# Patient Record
Sex: FEMALE | Race: BLACK OR AFRICAN AMERICAN | Employment: OTHER | URBAN - METROPOLITAN AREA
[De-identification: names, ages, dates, MRNs, and addresses within clinical notes are randomized per-mention and may not be internally consistent; named-entity substitution may affect disease eponyms.]

---

## 2017-02-08 ENCOUNTER — HOSPITAL ENCOUNTER (OUTPATIENT)
Dept: CT IMAGING | Age: 72
Discharge: HOME | End: 2017-02-08
Attending: PHYSICIAN ASSISTANT
Payer: MEDICARE

## 2017-02-08 DIAGNOSIS — R10.32 LEFT LOWER QUADRANT PAIN: ICD-10-CM

## 2017-02-08 LAB — CREAT UR-MCNC: 0.7 MG/DL (ref 0.6–1.3)

## 2017-02-08 PROCEDURE — 74177 CT ABD & PELVIS W/CONTRAST: CPT

## 2017-02-08 PROCEDURE — 82565 ASSAY OF CREATININE: CPT

## 2017-02-08 PROCEDURE — 74011636320 HC RX REV CODE- 636/320: Performed by: PHYSICIAN ASSISTANT

## 2017-02-08 RX ADMIN — IOPAMIDOL 100 ML: 612 INJECTION, SOLUTION INTRAVENOUS at 09:54

## 2017-03-21 ENCOUNTER — HOSPITAL ENCOUNTER (OUTPATIENT)
Dept: LAB | Age: 72
Discharge: HOME | End: 2017-03-21
Payer: MEDICARE

## 2017-03-21 DIAGNOSIS — R10.32 LEFT LOWER QUADRANT PAIN: ICD-10-CM

## 2017-03-21 LAB
BASOPHILS # BLD AUTO: 0 K/UL (ref 0–0.1)
BASOPHILS # BLD: 0 % (ref 0–2)
DIFFERENTIAL METHOD BLD: ABNORMAL
EOSINOPHIL # BLD: 0.1 K/UL (ref 0–0.4)
EOSINOPHIL NFR BLD: 1 % (ref 0–5)
ERYTHROCYTE [DISTWIDTH] IN BLOOD BY AUTOMATED COUNT: 12.7 % (ref 11.6–14.5)
HCT VFR BLD AUTO: 40.3 % (ref 35–45)
HGB BLD-MCNC: 13.4 G/DL (ref 12–16)
LYMPHOCYTES # BLD AUTO: 45 % (ref 21–52)
LYMPHOCYTES # BLD: 3.5 K/UL (ref 0.9–3.6)
MCH RBC QN AUTO: 33.3 PG (ref 24–34)
MCHC RBC AUTO-ENTMCNC: 33.3 G/DL (ref 31–37)
MCV RBC AUTO: 100 FL (ref 74–97)
MONOCYTES # BLD: 0.5 K/UL (ref 0.05–1.2)
MONOCYTES NFR BLD AUTO: 7 % (ref 3–10)
NEUTS SEG # BLD: 3.6 K/UL (ref 1.8–8)
NEUTS SEG NFR BLD AUTO: 47 % (ref 40–73)
PLATELET # BLD AUTO: 229 K/UL (ref 135–420)
PMV BLD AUTO: 10.5 FL (ref 9.2–11.8)
RBC # BLD AUTO: 4.03 M/UL (ref 4.2–5.3)
WBC # BLD AUTO: 7.7 K/UL (ref 4.6–13.2)

## 2017-03-21 PROCEDURE — 36415 COLL VENOUS BLD VENIPUNCTURE: CPT | Performed by: INTERNAL MEDICINE

## 2017-03-21 PROCEDURE — 85025 COMPLETE CBC W/AUTO DIFF WBC: CPT | Performed by: INTERNAL MEDICINE

## 2017-04-26 ENCOUNTER — OFFICE VISIT (OUTPATIENT)
Dept: ORTHOPEDIC SURGERY | Age: 72
End: 2017-04-26

## 2017-04-26 VITALS
TEMPERATURE: 98.2 F | RESPIRATION RATE: 16 BRPM | SYSTOLIC BLOOD PRESSURE: 133 MMHG | BODY MASS INDEX: 29.63 KG/M2 | DIASTOLIC BLOOD PRESSURE: 70 MMHG | HEIGHT: 62 IN | HEART RATE: 65 BPM | WEIGHT: 161 LBS

## 2017-04-26 DIAGNOSIS — G57.92 NEURITIS OF LEFT LOWER EXTREMITY: ICD-10-CM

## 2017-04-26 DIAGNOSIS — G89.29 OTHER CHRONIC PAIN: ICD-10-CM

## 2017-04-26 DIAGNOSIS — M47.812 CERVICAL FACET JOINT SYNDROME: ICD-10-CM

## 2017-04-26 DIAGNOSIS — Z51.81 THERAPEUTIC DRUG MONITORING: ICD-10-CM

## 2017-04-26 DIAGNOSIS — M48.061 LUMBAR SPINAL STENOSIS: ICD-10-CM

## 2017-04-26 DIAGNOSIS — G89.29 OTHER CHRONIC PAIN: Primary | ICD-10-CM

## 2017-04-26 DIAGNOSIS — M51.26 HNP (HERNIATED NUCLEUS PULPOSUS), LUMBAR: ICD-10-CM

## 2017-04-26 RX ORDER — HYDROCODONE BITARTRATE AND ACETAMINOPHEN 5; 325 MG/1; MG/1
TABLET ORAL
Qty: 30 TAB | Refills: 0 | Status: SHIPPED | OUTPATIENT
Start: 2017-04-26 | End: 2018-06-18

## 2017-04-26 NOTE — MR AVS SNAPSHOT
Visit Information Date & Time Provider Department Dept. Phone Encounter #  
 4/26/2017  9:15 AM Monique To MD [facility] [phone] [id number] Follow-up Instructions Return for Medication follow up. Upcoming Health Maintenance Date Due DTaP/Tdap/Td series (1 - Tdap) 12/7/1965 FOBT Q 1 YEAR AGE 50-75 12/7/1994 ZOSTER VACCINE AGE 60> 12/7/2004 GLAUCOMA SCREENING Q2Y 12/7/2009 Pneumococcal 65+ Low/Medium Risk (1 of 2 - PCV13) 12/7/2009 MEDICARE YEARLY EXAM 12/7/2009 INFLUENZA AGE 9 TO ADULT 8/1/2016 BREAST CANCER SCRN MAMMOGRAM 11/11/2018 Allergies as of 4/26/2017  Review Complete On: 4/26/2017 By: Monique To MD  
  
 Severity Noted Reaction Type Reactions Latex, Natural Rubber  07/14/2014    Itching Lotrel [Amlodipine-benazepril]  07/14/2014    Shortness of Breath Current Immunizations  Never Reviewed No immunizations on file. Not reviewed this visit You Were Diagnosed With   
  
 Codes Comments Lumbar spinal stenosis    -  Primary ICD-10-CM: M48.06 
ICD-9-CM: 724.02 Therapeutic drug monitoring     ICD-10-CM: Z51.81 
ICD-9-CM: V58.83 Neuritis of left lower extremity     ICD-10-CM: G57.92 
ICD-9-CM: 355.8 Other chronic pain     ICD-10-CM: G89.29 ICD-9-CM: 338.29 Cervical facet joint syndrome     ICD-10-CM: M12.88 ICD-9-CM: 723.8 HNP (herniated nucleus pulposus), lumbar     ICD-10-CM: M51.26 
ICD-9-CM: 722.10 Vitals BP Pulse Temp Resp Height(growth percentile) Weight(growth percentile) 133/70 65 98.2 °F (36.8 °C) (Oral) 16 5' 2\" (1.575 m) 161 lb (73 kg) BMI OB Status Smoking Status 29.45 kg/m2 Postmenopausal Current Every Day Smoker BMI and BSA Data Body Mass Index Body Surface Area  
 29.45 kg/m 2 1.79 m 2 Preferred Pharmacy Pharmacy Name Phone [id number] [address] [phone] Your Updated Medication List  
  
   
This list is accurate as of: 4/26/17  9:49 AM.  Always use your most recent med list.  
  
  
  
  
 acetaZOLAMIDE  mg capsule Commonly known as:  DIAMOX Take 500 mg by mouth daily. amLODIPine 5 mg tablet Commonly known as:  Lizzette Radha Take 5 mg by mouth daily. aspirin delayed-release 81 mg tablet Take  by mouth daily. atorvastatin 10 mg tablet Commonly known as:  LIPITOR Take 10 mg by mouth daily. gabapentin 300 mg capsule Commonly known as:  NEURONTIN Take 1 Cap by mouth three (3) times daily. Indications: NEUROPATHIC PAIN  
  
 glimepiride 2 mg tablet Commonly known as:  AMARYL Take 2 mg by mouth two (2) times a day. * HYDROcodone-acetaminophen 5-325 mg per tablet Commonly known as:  Mikey Troy 1 po qhs prn severe pain  
  
 * HYDROcodone-acetaminophen 5-325 mg per tablet Commonly known as:  NORCO  
TAKE 1 TAB Q PM PRN FOR SEVERE PAIN  
  
 ketoconazole 2 % topical cream  
Commonly known as:  NIZORAL  
  
 lisinopril 5 mg tablet Commonly known as:  Dulcie Mcchuckyey Take 5 mg by mouth daily. metFORMIN 1,000 mg tablet Commonly known as:  GLUCOPHAGE Take 1,000 mg by mouth two (2) times daily (with meals). omeprazole 20 mg capsule Commonly known as:  PRILOSEC Take 20 mg by mouth daily. traZODone 50 mg tablet Commonly known as:  Elsmere Belling Take 50 mg by mouth nightly. VENTOLIN HFA 90 mcg/actuation inhaler Generic drug:  albuterol Take 2 Puffs by inhalation every four (4) hours as needed. * Notice: This list has 2 medication(s) that are the same as other medications prescribed for you. Read the directions carefully, and ask your doctor or other care provider to review them with you. Prescriptions Printed Refills HYDROcodone-acetaminophen (NORCO) 5-325 mg per tablet 0 Sig: TAKE 1 TAB Q PM PRN FOR SEVERE PAIN Class: Print Follow-up Instructions Return for Medication follow up. To-Do List   
 04/26/2017 Lab:  DRUG SCREEN UR - W/ CONFIRM Patient Instructions Low Back Arthritis: Exercises Your Care Instructions Here are some examples of typical rehabilitation exercises for your condition. Start each exercise slowly. Ease off the exercise if you start to have pain. Your doctor or physical therapist will tell you when you can start these exercises and which ones will work best for you. When you are not being active, find a comfortable position for rest. Some people are comfortable on the floor or a medium-firm bed with a small pillow under their head and another under their knees. Some people prefer to lie on their side with a pillow between their knees. Don't stay in one position for too long. Take short walks (10 to 20 minutes) every 2 to 3 hours. Avoid slopes, hills, and stairs until you feel better. Walk only distances you can manage without pain, especially leg pain. How to do the exercises Pelvic tilt 1. Lie on your back with your knees bent. 2. \"Brace\" your stomachtighten your muscles by pulling in and imagining your belly button moving toward your spine. 3. Press your lower back into the floor. You should feel your hips and pelvis rock back. 4. Hold for 6 seconds while breathing smoothly. 5. Relax and allow your pelvis and hips to rock forward. 6. Repeat 8 to 12 times. Back stretches 1. Get down on your hands and knees on the floor. 2. Relax your head and allow it to droop. Round your back up toward the ceiling until you feel a nice stretch in your upper, middle, and lower back. Hold this stretch for as long as it feels comfortable, or about 15 to 30 seconds. 3. Return to the starting position with a flat back while you are on your hands and knees. 4. Let your back sway by pressing your stomach toward the floor. Lift your buttocks toward the ceiling. 5. Hold this position for 15 to 30 seconds. 6. Repeat 2 to 4 times. Follow-up care is a key part of your treatment and safety. Be sure to make and go to all appointments, and call your doctor if you are having problems. It's also a good idea to know your test results and keep a list of the medicines you take. Where can you learn more? Go to http://ban-ovidio.info/. Enter A782 in the search box to learn more about \"Low Back Arthritis: Exercises. \" Current as of: May 23, 2016 Content Version: 11.2 © 7933-3228 [employer]. Care instructions adapted under license by [employer] (which disclaims liability or warranty for this information). If you have questions about a medical condition or this instruction, always ask your healthcare professional. [address] any warranty or liability for your use of this information. Herniated Disc: Exercises Your Care Instructions Here are some examples of typical rehabilitation exercises for your condition. Start each exercise slowly. Ease off the exercise if you start to have pain. Your doctor or physical therapist will tell you when you can start these exercises and which ones will work best for you. How to do the exercises Note: These exercises can help you move easier and feel better. But when you first start doing them, you may have more pain in your back. This is normal. But it is important to pay close attention to your pain during and after each exercise. · Keep doing these exercises if your pain stays the same or moves from your leg and buttock more toward the middle of your spine. Pain moving out of your leg and buttock is a good sign. · Stop doing these exercises if your pain gets worse in your leg and buttock. Stop if you start to have pain in your leg and buttock that you didn't have before. Be sure to do these exercises in the order they appear.  Note how your pain changes before you move to the next one. If your pain is much worse right after exercise and stays worse the next day, do not do any of these exercises. 1. Rest on belly 7. Lie on your stomach, face down, with your head turned to the side. Place your arms beside your body. If this bothers your neck, place your hands, one on top of the other, underneath your forehead. This will help support your head and neck. 8. Try to relax your lower back muscles as much as you can. 9. Continue to lie on your stomach for 2 minutes. 10. If your pain spreads down your leg or increases down your leg, stop this exercise and do not do the next exercises. 2. Press-up 1. Lie on your stomach, face down. Keep your elbows tucked into your sides and under your shoulders. 2. Press your elbows down into the floor to raise your upper back. As you do this, relax your stomach muscles. Allow your back to arch without using your back muscles. Let your low back relax completely as you arch up. 3. Hold this position for 2 minutes. 4. Repeat 2 to 4 times. 5. If your pain spreads down your leg or increases down your leg, stop this exercise and do not do the next exercises. 3. Full press-up 1. Lie on your stomach, face down. Keep your elbows tucked into your sides and under your shoulders. 2. Straighten your elbows, and push your upper body up as far as you can. Allow your lower back to sag. Keep your hips, pelvis, and legs relaxed. 3. Hold this position for 5 seconds, and then relax. 4. Repeat 10 times. Each time, try to raise your upper body a little higher and hold your arms a bit straighter. 5. If your pain spreads down your leg or gets worse down your leg, stop this exercise and do not move to the next exercise. 6. If you can't do this exercise, you may instead try the backward bend exercise that follows. 4. Backward bend · Stand with your feet hip-width apart. Your toes should point forward. Do not lock your knees. · Place your hands in the small of your back. · Bend backward as far as you can, keeping your knees straight. Hold this position for 2 to 3 seconds. Then return to your starting position. · Repeat 2 to 4 times. Each time, try to bend backward a little farther, until you bend backward as far as you can. · If your pain spreads down your leg or increases down your leg, stop this exercise. Follow-up care is a key part of your treatment and safety. Be sure to make and go to all appointments, and call your doctor if you are having problems. It's also a good idea to know your test results and keep a list of the medicines you take. Where can you learn more? Go to http://ban-ovidio.info/. Enter 51503 [phone] in the search box to learn more about \"Herniated Disc: Exercises. \" Current as of: May 23, 2016 Content Version: 11.2 © 9009-6350 [employer]. Care instructions adapted under license by [employer] (which disclaims liability or warranty for this information). If you have questions about a medical condition or this instruction, always ask your healthcare professional. [place] [address] any warranty or liability for your use of this information. Please provide this summary of care documentation to your next provider. Your primary care clinician is listed as SAHRON SOTO. If you have any questions after today's visit, please call [phone].

## 2017-04-26 NOTE — PROGRESS NOTES
[facility]  [address] [place], [facility] [address]  Phone: [phone]  Fax: [phone]      ASSESSMENT   Timothy Mcpherson was seen today for back pain and follow-up. Diagnoses and all orders for this visit:    Other chronic pain  -     HYDROcodone-acetaminophen (NORCO) 5-325 mg per tablet; TAKE 1 TAB Q PM PRN FOR SEVERE PAIN  -     DRUG SCREEN UR - W/ CONFIRM; Future    Therapeutic drug monitoring  -     HYDROcodone-acetaminophen (NORCO) 5-325 mg per tablet; TAKE 1 TAB Q PM PRN FOR SEVERE PAIN  -     DRUG SCREEN UR - W/ CONFIRM; Future    Lumbar spinal stenosis  -     gabapentin (NEURONTIN) 300 mg capsule; Take 1 Cap by mouth three (3) times daily. Indications: NEUROPATHIC PAIN    Neuritis of left lower extremity  -     gabapentin (NEURONTIN) 300 mg capsule; Take 1 Cap by mouth three (3) times daily. Indications: NEUROPATHIC PAIN    Cervical facet joint syndrome    HNP (herniated nucleus pulposus), lumbar         IMPRESSION AND PLAN:  Manuelito Amaro is a 66 y.o. female with history of cervical and lumbar pain. Pt reports that her lower back pain returned about 2 weeks ago. She c/o pain when laying on her right side and is afraid her back will \"lock up\" again, as it did in 2015. Pt continues to take Neurontin 300 mg 1 tab TID and Norco 5-325 mg as needed for severe pain. 1) Pt was given information on lumbar arthritis and herniated disc exercises. 2) I recommended the Pt to exercise regularly and perform core strengthening exercises. 3) She was instructed to avoid bending and lifting. 4) Pt received a refill of Norco 5-325 mg 1 tab QHS prn severe pain. 5) She will contact the office when she needs a refill of Neurontin 300 mg 1 tab TID. 6) Ms. Juan Baca has a reminder for a \"due or due soon\" health maintenance. I have asked that she contact her primary care provider, Veronica Grier MD, for follow-up on this health maintenance.   7)  demonstrated consistency with prescribing. 8) A UDS was obtained and Pt signed a pain agreement. 9) Pt will follow-up in 3 months. HISTORY OF PRESENT ILLNESS:  Jamir Lux is a 66 y.o. female with history of cervical and lumbar pain. Pt reports that her lower back pain returned about 2 weeks ago. She c/o pain when laying on her right side. Pt states that she is afraid her back will \"lock up\" on her again. Of note, Pt lives alone. She states that her back \"locked up\" in 2015 when she got out of bed in the morning to use the bathroom. Pt reports that she did not contact any family or went to the ER after the incident since she was experiencing so much pain at that time, she did not anyone to touch her. She admits to experiencing pain radiating posteriorly down both thighs at the time. Pt continues to take Neurontin 300 mg 1 tab TID. She takes Norco 5-325 mg for severe pain and generally takes 1-2 tabs daily as needed. Of note, Pt is diabetic and reports that her blood sugars are well controlled at this time. Pt at this time desires to continue with current care. Pain Scale: 9/10    PCP: Leigh Recinos MD       Past Medical History:   Diagnosis Date    Arthritis     Diabetes ([address]     takes metformin    GERD (gastroesophageal reflux disease)     High cholesterol     Hypertension     Ill-defined condition     high cholesterol    Lumbar pain         Social History     Social History    Marital status: SINGLE     Spouse name: N/A    Number of children: N/A    Years of education: N/A     Occupational History    Not on file.      Social History Main Topics    Smoking status: Current Every Day Smoker     Packs/day: 0.25    Smokeless tobacco: Not on file    Alcohol use No    Drug use: No    Sexual activity: Not on file     Other Topics Concern    Not on file     Social History Narrative       Current Outpatient Prescriptions   Medication Sig Dispense Refill    gabapentin (NEURONTIN) 300 mg capsule Take 1 Cap by mouth three (3) times daily. Indications: NEUROPATHIC PAIN 90 Cap 5    HYDROcodone-acetaminophen (NORCO) 5-325 mg per tablet TAKE 1 TAB Q PM PRN FOR SEVERE PAIN 30 Tab 0    metFORMIN (GLUCOPHAGE) 1,000 mg tablet Take 1,000 mg by mouth two (2) times daily (with meals).  atorvastatin (LIPITOR) 10 mg tablet Take 10 mg by mouth daily.  ketoconazole (NIZORAL) 2 % topical cream       lisinopril (PRINIVIL, ZESTRIL) 5 mg tablet Take 5 mg by mouth daily.  VENTOLIN HFA 90 mcg/actuation inhaler Take 2 Puffs by inhalation every four (4) hours as needed.  aspirin delayed-release 81 mg tablet Take  by mouth daily.  amLODIPine (NORVASC) 5 mg tablet Take 5 mg by mouth daily.  glimepiride (AMARYL) 2 mg tablet Take 2 mg by mouth two (2) times a day.  acetaZOLAMIDE SR (DIAMOX) 500 mg capsule Take 500 mg by mouth daily.  traZODone (DESYREL) 50 mg tablet Take 50 mg by mouth nightly.  HYDROcodone-acetaminophen (NORCO) 5-325 mg per tablet 1 po qhs prn severe pain 30 Tab 0    omeprazole (PRILOSEC) 20 mg capsule Take 20 mg by mouth daily. Allergies   Allergen Reactions    Latex, Natural Rubber Itching    Lotrel [Amlodipine-Benazepril] Shortness of Breath         REVIEW OF SYSTEMS    Constitutional: Negative for fever, chills, or weight change. Respiratory: Negative for cough or shortness of breath. Cardiovascular: Negative for chest pain or palpitations. Gastrointestinal: Negative for acid reflux, change in bowel habits, or constipation. Genitourinary: Negative for dysuria and flank pain. Musculoskeletal: Positive for lumbar pain. Skin: Negative for rash. Neurological: Negative for headaches, dizziness, or numbness. Endo/Heme/Allergies: Negative for increased bruising. Psychiatric/Behavioral: Negative for difficulty with sleep.       PHYSICAL EXAMINATION  Visit Vitals    /70    Pulse 65    Temp 98.2 °F (36.8 °C) (Oral)    Resp 16    Ht 5' 2\" (1.575 m)    Wt 161 lb (73 kg)    BMI 29.45 kg/m2       Constitutional: Awake, alert, and in no acute distress  Neurological: 1+ symmetrical DTRs in the lower extremities. Sensation to light touch is intact. Skin: warm, dry, and intact. Musculoskeletal: Tenderness to palpation in the lower lumbar region. Moderate pain with extension, axial loading, and forward flexion. No pain with internal or external rotation of her hips. Negative straight leg raise bilaterally. Hip Flex  Quads Hamstrings Ankle DF EHL Ankle PF   Right +4/5 +4/5 +4/5 +4/5 +4/5 +4/5   Left +4/5 +4/5 +4/5 +4/5 +4/5 +4/5     IMAGING:    Lumbar Spine MRI from 02/29/2012 was personally reviewed with the Pt and demonstrated:  Findings:  Sagittal images reveal overall normal vertebral body morphology. No   fractures noted. No suspicious lesions.       Alignments are anatomic, no evidence for subluxation.    Conus medullaris ends at the L1  vertebral body level. Correlation of axial and sagittal images reveals the following:  At L1-L2: No significant disc pathology. No significant facet   arthropathy. No central canal or foraminal stenosis. At L2-L3: No significant disc pathology. No significant facet   arthropathy. No central canal or foraminal stenosis. At L3-L4: Mild broad-based disc protrusion. No central canal   stenosis. Mild facet arthropathy. Mild right foraminal stenosis. At L4-L5: Minimal protrusion of the disc at the posterolateral   corners. Mild bilateral foraminal stenosis. No central canal   stenosis. Mild facet arthropathy. At L5-S1: Mild to moderate loss of disc height. Circumferential disc   osteophyte complex which is most prominent at the posterolateral   corners. Resultant moderate-to-severe bilateral foraminal stenosis   in conjunction with mild to moderate facet arthropathy. Mild central   canal stenosis by measurement.   Visualized portions of the sacroiliac joints are unremarkable.    Incidentally imaged retroperitoneal structures are unremarkable as   well. IMPRESSION:  Degenerative changes as above.      Cervical Spine X-rays form 2013 were personally reviewed with the Pt and demonstrated:  DISH and degenerative facets. Written by Raenell Moritz, as dictated by Saman Mckeon MD.  I, Dr. Saman Mckeon confirm that all documentation is accurate.

## 2017-04-26 NOTE — PATIENT INSTRUCTIONS
Low Back Arthritis: Exercises  Your Care Instructions  Here are some examples of typical rehabilitation exercises for your condition. Start each exercise slowly. Ease off the exercise if you start to have pain. Your doctor or physical therapist will tell you when you can start these exercises and which ones will work best for you. When you are not being active, find a comfortable position for rest. Some people are comfortable on the floor or a medium-firm bed with a small pillow under their head and another under their knees. Some people prefer to lie on their side with a pillow between their knees. Don't stay in one position for too long. Take short walks (10 to 20 minutes) every 2 to 3 hours. Avoid slopes, hills, and stairs until you feel better. Walk only distances you can manage without pain, especially leg pain. How to do the exercises  Pelvic tilt    1. Lie on your back with your knees bent. 2. \"Brace\" your stomach--tighten your muscles by pulling in and imagining your belly button moving toward your spine. 3. Press your lower back into the floor. You should feel your hips and pelvis rock back. 4. Hold for 6 seconds while breathing smoothly. 5. Relax and allow your pelvis and hips to rock forward. 6. Repeat 8 to 12 times. Back stretches    1. Get down on your hands and knees on the floor. 2. Relax your head and allow it to droop. Round your back up toward the ceiling until you feel a nice stretch in your upper, middle, and lower back. Hold this stretch for as long as it feels comfortable, or about 15 to 30 seconds. 3. Return to the starting position with a flat back while you are on your hands and knees. 4. Let your back sway by pressing your stomach toward the floor. Lift your buttocks toward the ceiling. 5. Hold this position for 15 to 30 seconds. 6. Repeat 2 to 4 times. Follow-up care is a key part of your treatment and safety.  Be sure to make and go to all appointments, and call your doctor if you are having problems. It's also a good idea to know your test results and keep a list of the medicines you take. Where can you learn more? Go to http://ban-ovidio.info/. Enter V821 in the search box to learn more about \"Low Back Arthritis: Exercises. \"  Current as of: May 23, 2016  Content Version: 11.2  © 8064-7267 [employer]. Care instructions adapted under license by [employer] (which disclaims liability or warranty for this information). If you have questions about a medical condition or this instruction, always ask your healthcare professional. [address] any warranty or liability for your use of this information. Herniated Disc: Exercises  Your Care Instructions  Here are some examples of typical rehabilitation exercises for your condition. Start each exercise slowly. Ease off the exercise if you start to have pain. Your doctor or physical therapist will tell you when you can start these exercises and which ones will work best for you. How to do the exercises  Note: These exercises can help you move easier and feel better. But when you first start doing them, you may have more pain in your back. This is normal. But it is important to pay close attention to your pain during and after each exercise. · Keep doing these exercises if your pain stays the same or moves from your leg and buttock more toward the middle of your spine. Pain moving out of your leg and buttock is a good sign. · Stop doing these exercises if your pain gets worse in your leg and buttock. Stop if you start to have pain in your leg and buttock that you didn't have before. Be sure to do these exercises in the order they appear. Note how your pain changes before you move to the next one. If your pain is much worse right after exercise and stays worse the next day, do not do any of these exercises. 1. Rest on belly    7.  Lie on your stomach, face down, with your head turned to the side. Place your arms beside your body. If this bothers your neck, place your hands, one on top of the other, underneath your forehead. This will help support your head and neck. 8. Try to relax your lower back muscles as much as you can. 9. Continue to lie on your stomach for 2 minutes. 10. If your pain spreads down your leg or increases down your leg, stop this exercise and do not do the next exercises. 2. Press-up    1. Lie on your stomach, face down. Keep your elbows tucked into your sides and under your shoulders. 2. Press your elbows down into the floor to raise your upper back. As you do this, relax your stomach muscles. Allow your back to arch without using your back muscles. Let your low back relax completely as you arch up. 3. Hold this position for 2 minutes. 4. Repeat 2 to 4 times. 5. If your pain spreads down your leg or increases down your leg, stop this exercise and do not do the next exercises. 3. Full press-up    1. Lie on your stomach, face down. Keep your elbows tucked into your sides and under your shoulders. 2. Straighten your elbows, and push your upper body up as far as you can. Allow your lower back to sag. Keep your hips, pelvis, and legs relaxed. 3. Hold this position for 5 seconds, and then relax. 4. Repeat 10 times. Each time, try to raise your upper body a little higher and hold your arms a bit straighter. 5. If your pain spreads down your leg or gets worse down your leg, stop this exercise and do not move to the next exercise. 6. If you can't do this exercise, you may instead try the backward bend exercise that follows. 4. Backward bend    · Stand with your feet hip-width apart. Your toes should point forward. Do not lock your knees. · Place your hands in the small of your back. · Bend backward as far as you can, keeping your knees straight. Hold this position for 2 to 3 seconds. Then return to your starting position.   · Repeat 2 to 4 times. Each time, try to bend backward a little farther, until you bend backward as far as you can. · If your pain spreads down your leg or increases down your leg, stop this exercise. Follow-up care is a key part of your treatment and safety. Be sure to make and go to all appointments, and call your doctor if you are having problems. It's also a good idea to know your test results and keep a list of the medicines you take. Where can you learn more? Go to http://ban-ovidio.info/. Enter 79241 [id number] in the search box to learn more about \"Herniated Disc: Exercises. \"  Current as of: May 23, 2016  Content Version: 11.2  © 0984-0804 [employer]. Care instructions adapted under license by [employer] (which disclaims liability or warranty for this information). If you have questions about a medical condition or this instruction, always ask your healthcare professional. [place] [address] any warranty or liability for your use of this information.

## 2017-04-28 RX ORDER — GABAPENTIN 300 MG/1
300 CAPSULE ORAL 3 TIMES DAILY
Qty: 90 CAP | Refills: 5 | Status: SHIPPED | OUTPATIENT
Start: 2017-04-28 | End: 2017-07-20 | Stop reason: SDUPTHER

## 2017-07-20 ENCOUNTER — OFFICE VISIT (OUTPATIENT)
Dept: ORTHOPEDIC SURGERY | Age: 72
End: 2017-07-20

## 2017-07-20 VITALS
TEMPERATURE: 98.5 F | DIASTOLIC BLOOD PRESSURE: 81 MMHG | SYSTOLIC BLOOD PRESSURE: 134 MMHG | RESPIRATION RATE: 18 BRPM | HEART RATE: 74 BPM

## 2017-07-20 DIAGNOSIS — G57.92 NEURITIS OF LEFT LOWER EXTREMITY: ICD-10-CM

## 2017-07-20 DIAGNOSIS — G89.29 OTHER CHRONIC PAIN: Primary | ICD-10-CM

## 2017-07-20 DIAGNOSIS — M16.12 OSTEOARTHRITIS OF LEFT HIP, UNSPECIFIED OSTEOARTHRITIS TYPE: ICD-10-CM

## 2017-07-20 DIAGNOSIS — M47.812 CERVICAL FACET JOINT SYNDROME: ICD-10-CM

## 2017-07-20 DIAGNOSIS — M48.061 LUMBAR SPINAL STENOSIS: ICD-10-CM

## 2017-07-20 DIAGNOSIS — M79.2 NEURITIS: ICD-10-CM

## 2017-07-20 RX ORDER — GABAPENTIN 300 MG/1
300 CAPSULE ORAL 3 TIMES DAILY
Qty: 270 CAP | Refills: 2 | Status: SHIPPED | OUTPATIENT
Start: 2017-07-20

## 2017-07-20 NOTE — PROGRESS NOTES
[facility]  [address] [place], [facility] [address]  Phone: [phone]  Fax: [phone]      ASSESSMENT   Nga Duncan was seen today for back pain. Diagnoses and all orders for this visit:    Other chronic pain    Lumbar spinal stenosis  -     gabapentin (NEURONTIN) 300 mg capsule; Take 1 Cap by mouth three (3) times daily. Indications: NEUROPATHIC PAIN    Neuritis    Cervical facet joint syndrome    Neuritis of left lower extremity  -     gabapentin (NEURONTIN) 300 mg capsule; Take 1 Cap by mouth three (3) times daily. Indications: NEUROPATHIC PAIN    Osteoarthritis of left hip, unspecified osteoarthritis type         IMPRESSION AND PLAN:  Tiburcio Bai is a 66 y.o. female with history of chronic lumbar pain. She c/o pain in the lower back and left hip when walking. Pt continues to take Norco 5-325 mg as her pain warrants and Neurontin 300 mg 1 tab TID. 1) Pt was given information on lumbar and hip arthritis exercises. 2) She will continue taking Norco 5-325 mg as prescribed and does not need a refill at this time. 3) Pt received a refill of Neurontin 300 mg 1 tab TID. 4) I recommended she try Tylenol 500 mg 1-2 tabs BID. 5) Ms. Tony Santamaria has a reminder for a \"due or due soon\" health maintenance. I have asked that she contact her primary care provider, Sarah Devlin MD, for follow-up on this health maintenance. 6)  demonstrated consistency with prescribing. 7) Last UDS from 04/26/2017 was consistent. 8) Pt will follow-up in 3 months. HISTORY OF PRESENT ILLNESS:  Tiburcio Bai is a 66 y.o. female with history of chronic lumbar pain. She c/o pain in the lower back and left hip when walking. Pt reports that she works in her yard frequently and thinks this may cause increased pain. Pt continues to take Norco 5-325 mg as her pain warrants. She also takes Neurontin 300 mg 1 tab TID without sedation, dizziness, or lightheadedness.  Pt takes aspirin daily. She reports that she has a history of bleeding ulcers and has not tried Tylenol. Pt at this time desires to continue with current care. Pain Scale: 6/10    PCP: Dave Orta MD       Past Medical History:   Diagnosis Date    Arthritis     Diabetes ([address]     takes metformin    GERD (gastroesophageal reflux disease)     High cholesterol     Hypertension     Ill-defined condition     high cholesterol    Lumbar pain         Social History     Social History    Marital status: SINGLE     Spouse name: N/A    Number of children: N/A    Years of education: N/A     Occupational History    Not on file. Social History Main Topics    Smoking status: Current Every Day Smoker     Packs/day: 0.25    Smokeless tobacco: Not on file    Alcohol use No    Drug use: No    Sexual activity: Not on file     Other Topics Concern    Not on file     Social History Narrative       Current Outpatient Prescriptions   Medication Sig Dispense Refill    gabapentin (NEURONTIN) 300 mg capsule Take 1 Cap by mouth three (3) times daily. Indications: NEUROPATHIC PAIN 270 Cap 2    HYDROcodone-acetaminophen (NORCO) 5-325 mg per tablet TAKE 1 TAB Q PM PRN FOR SEVERE PAIN 30 Tab 0    metFORMIN (GLUCOPHAGE) 1,000 mg tablet Take 1,000 mg by mouth two (2) times daily (with meals).  HYDROcodone-acetaminophen (NORCO) 5-325 mg per tablet 1 po qhs prn severe pain 30 Tab 0    atorvastatin (LIPITOR) 10 mg tablet Take 10 mg by mouth daily.  lisinopril (PRINIVIL, ZESTRIL) 5 mg tablet Take 5 mg by mouth daily.  aspirin delayed-release 81 mg tablet Take  by mouth daily.  amLODIPine (NORVASC) 5 mg tablet Take 5 mg by mouth daily.  glimepiride (AMARYL) 2 mg tablet Take 2 mg by mouth two (2) times a day.  acetaZOLAMIDE SR (DIAMOX) 500 mg capsule Take 500 mg by mouth daily.  traZODone (DESYREL) 50 mg tablet Take 50 mg by mouth nightly.       ketoconazole (NIZORAL) 2 % topical cream       omeprazole (PRILOSEC) 20 mg capsule Take 20 mg by mouth daily.  VENTOLIN HFA 90 mcg/actuation inhaler Take 2 Puffs by inhalation every four (4) hours as needed. Allergies   Allergen Reactions    Latex, Natural Rubber Itching    Lotrel [Amlodipine-Benazepril] Shortness of Breath         REVIEW OF SYSTEMS    Constitutional: Negative for fever, chills, or weight change. Respiratory: Negative for cough or shortness of breath. Cardiovascular: Negative for chest pain or palpitations. Gastrointestinal: Negative for acid reflux, change in bowel habits, or constipation. Genitourinary: Negative for dysuria and flank pain. Musculoskeletal: Positive for lumbar and cervical pain. Skin: Negative for rash. Neurological: Negative for headaches, dizziness, or numbness. Endo/Heme/Allergies: Negative for increased bruising. Psychiatric/Behavioral: Negative for difficulty with sleep. PHYSICAL EXAMINATION  Visit Vitals    /81    Pulse 74    Temp 98.5 °F (36.9 °C) (Oral)    Resp 18       Constitutional: Awake, alert, and in no acute distress  Neurological: 1+ symmetrical DTRs in the lower extremities. Sensation to light touch is intact. Skin: warm, dry, and intact. Musculoskeletal: Tenderness to palpation in the lower lumbar region. Moderate pain with extension and axial loading. Mild pain with left hip flexion and internal rotation of her left hip. Negative straight leg raise bilaterally. Hip Flex  Quads Hamstrings Ankle DF EHL Ankle PF   Right +4/5 +4/5 +4/5 +4/5 +4/5 +4/5   Left +4/5 +4/5 +4/5 +4/5 +4/5 +4/5     IMAGING:    Lumbar Spine MRI from 02/29/2012 was personally reviewed with the Pt and demonstrated:  Findings:  Sagittal images reveal overall normal vertebral body morphology. No   fractures noted. No suspicious lesions.       Alignments are anatomic, no evidence for subluxation.    Conus medullaris ends at the L1  vertebral body level.   Correlation of axial and sagittal images reveals the following:  At L1-L2: No significant disc pathology. No significant facet   arthropathy. No central canal or foraminal stenosis. At L2-L3: No significant disc pathology. No significant facet   arthropathy. No central canal or foraminal stenosis. At L3-L4: Mild broad-based disc protrusion. No central canal   stenosis. Mild facet arthropathy. Mild right foraminal stenosis. At L4-L5: Minimal protrusion of the disc at the posterolateral   corners. Mild bilateral foraminal stenosis. No central canal   stenosis. Mild facet arthropathy. At L5-S1: Mild to moderate loss of disc height. Circumferential disc   osteophyte complex which is most prominent at the posterolateral   corners. Resultant moderate-to-severe bilateral foraminal stenosis   in conjunction with mild to moderate facet arthropathy. Mild central   canal stenosis by measurement. Visualized portions of the sacroiliac joints are unremarkable.    Incidentally imaged retroperitoneal structures are unremarkable as   well. IMPRESSION:  Degenerative changes as above.       Cervical Spine X-rays form 2013 were personally reviewed with the Pt and demonstrated:  DISH and degenerative facets. Written by Emmanuel Banks, as dictated by Jordan Siegel MD.  I, Dr. Jordan Siegel confirm that all documentation is accurate.

## 2017-07-20 NOTE — PATIENT INSTRUCTIONS
Low Back Arthritis: Exercises  Your Care Instructions  Here are some examples of typical rehabilitation exercises for your condition. Start each exercise slowly. Ease off the exercise if you start to have pain. Your doctor or physical therapist will tell you when you can start these exercises and which ones will work best for you. When you are not being active, find a comfortable position for rest. Some people are comfortable on the floor or a medium-firm bed with a small pillow under their head and another under their knees. Some people prefer to lie on their side with a pillow between their knees. Don't stay in one position for too long. Take short walks (10 to 20 minutes) every 2 to 3 hours. Avoid slopes, hills, and stairs until you feel better. Walk only distances you can manage without pain, especially leg pain. How to do the exercises  Pelvic tilt    1. Lie on your back with your knees bent. 2. \"Brace\" your stomach--tighten your muscles by pulling in and imagining your belly button moving toward your spine. 3. Press your lower back into the floor. You should feel your hips and pelvis rock back. 4. Hold for 6 seconds while breathing smoothly. 5. Relax and allow your pelvis and hips to rock forward. 6. Repeat 8 to 12 times. Back stretches    1. Get down on your hands and knees on the floor. 2. Relax your head and allow it to droop. Round your back up toward the ceiling until you feel a nice stretch in your upper, middle, and lower back. Hold this stretch for as long as it feels comfortable, or about 15 to 30 seconds. 3. Return to the starting position with a flat back while you are on your hands and knees. 4. Let your back sway by pressing your stomach toward the floor. Lift your buttocks toward the ceiling. 5. Hold this position for 15 to 30 seconds. 6. Repeat 2 to 4 times. Follow-up care is a key part of your treatment and safety.  Be sure to make and go to all appointments, and call your doctor if you are having problems. It's also a good idea to know your test results and keep a list of the medicines you take. Where can you learn more? Go to http://ban-ovidio.info/. Enter X050 in the search box to learn more about \"Low Back Arthritis: Exercises. \"  Current as of: March 21, 2017  Content Version: 11.3  © 2085-8936 [employer]. Care instructions adapted under license by [employer] (which disclaims liability or warranty for this information). If you have questions about a medical condition or this instruction, always ask your healthcare professional. [address] any warranty or liability for your use of this information. Low Back Arthritis: Exercises  Your Care Instructions  Here are some examples of typical rehabilitation exercises for your condition. Start each exercise slowly. Ease off the exercise if you start to have pain. Your doctor or physical therapist will tell you when you can start these exercises and which ones will work best for you. When you are not being active, find a comfortable position for rest. Some people are comfortable on the floor or a medium-firm bed with a small pillow under their head and another under their knees. Some people prefer to lie on their side with a pillow between their knees. Don't stay in one position for too long. Take short walks (10 to 20 minutes) every 2 to 3 hours. Avoid slopes, hills, and stairs until you feel better. Walk only distances you can manage without pain, especially leg pain. How to do the exercises  Pelvic tilt    7. Lie on your back with your knees bent. 8. \"Brace\" your stomach--tighten your muscles by pulling in and imagining your belly button moving toward your spine. 9. Press your lower back into the floor. You should feel your hips and pelvis rock back. 10. Hold for 6 seconds while breathing smoothly.   11. Relax and allow your pelvis and hips to rock forward. 12. Repeat 8 to 12 times. Back stretches    7. Get down on your hands and knees on the floor. 8. Relax your head and allow it to droop. Round your back up toward the ceiling until you feel a nice stretch in your upper, middle, and lower back. Hold this stretch for as long as it feels comfortable, or about 15 to 30 seconds. 9. Return to the starting position with a flat back while you are on your hands and knees. 10. Let your back sway by pressing your stomach toward the floor. Lift your buttocks toward the ceiling. 11. Hold this position for 15 to 30 seconds. 12. Repeat 2 to 4 times. Follow-up care is a key part of your treatment and safety. Be sure to make and go to all appointments, and call your doctor if you are having problems. It's also a good idea to know your test results and keep a list of the medicines you take. Where can you learn more? Go to http://ban-ovidio.info/. Enter U480 in the search box to learn more about \"Low Back Arthritis: Exercises. \"  Current as of: March 21, 2017  Content Version: 11.3  © 5988-3836 [employer]. Care instructions adapted under license by [employer] (which disclaims liability or warranty for this information). If you have questions about a medical condition or this instruction, always ask your healthcare professional. [address] any warranty or liability for your use of this information. Hip Arthritis: Exercises  Your Care Instructions  Here are some examples of exercises for hip arthritis. Start each exercise slowly. Ease off the exercise if you start to have pain. Your doctor or physical therapist will tell you when you can start these exercises and which ones will work best for you. How to do the exercises  Straight-leg raises to the outside    13. Lie on your side, with your affected hip on top.   14. Tighten the front thigh muscles of your top leg to keep your knee straight. 15. Keep your hip and your leg straight in line with the rest of your body, and keep your knee pointing forward. Do not drop your hip back. 16. Lift your top leg straight up toward the ceiling, about 12 inches off the floor. Hold for about 6 seconds, then slowly lower your leg. 17. Repeat 8 to 12 times. 18. Switch legs and repeat steps 1 through 5, even if only one hip is sore. Straight-leg raises to the inside    13. Lie on your side with your affected hip on the floor. 14. You can either prop up your other leg on a chair, or you can bend that knee and put that foot in front of your other knee. Do not drop your hip back. 15. Tighten the muscles on the front thigh of your bottom leg to straighten that knee. 16. Keep your kneecap pointing forward and your leg straight, and lift your bottom leg up toward the ceiling about 6 inches. Hold for about 6 seconds, then lower slowly. 17. Repeat 8 to 12 times. 18. Switch legs and repeat steps 1 through 5, even if only one hip is sore. Hip hike    1. Stand sideways on the bottom step of a staircase, and hold on to the banister or wall. 2. Keeping both knees straight, lift your good leg off the step and let it hang down. Then hike your good hip up to the same level as your affected hip or a little higher. 3. Repeat 8 to 12 times. 4. Switch legs and repeat steps 1 through 3, even if only one hip is sore. Bridging    1. Lie on your back with both knees bent. Your knees should be bent about 90 degrees. 2. Then push your feet into the floor, squeeze your buttocks, and lift your hips off the floor until your shoulders, hips, and knees are all in a straight line. 3. Hold for about 6 seconds as you continue to breathe normally, and then slowly lower your hips back down to the floor and rest for up to 10 seconds. 4. Repeat 8 to 12 times. Hamstring stretch (lying down)    1. Lie flat on your back with your legs straight.  If you feel discomfort in your back, place a small towel roll under your lower back. 2. Holding the back of your affected leg, lift your leg straight up and toward your body until you feel a stretch at the back of your thigh. 3. Hold the stretch for at least 30 seconds. 4. Repeat 2 to 4 times. 5. Switch legs and repeat steps 1 through 4, even if only one hip is sore. Standing quadriceps stretch    1. If you are not steady on your feet, hold on to a chair, counter, or wall. You can also lie on your stomach or your side to do this exercise. 2. Bend the knee of the leg you want to stretch, and reach behind you to grab the front of your foot or ankle with the hand on the same side. For example, if you are stretching your right leg, use your right hand. 3. Keeping your knees next to each other, pull your foot toward your buttock until you feel a gentle stretch across the front of your hip and down the front of your thigh. Your knee should be pointed directly to the ground, and not out to the side. 4. Hold the stretch for at least 15 to 30 seconds. 5. Repeat 2 to 4 times. 6. Switch legs and repeat steps 1 through 5, even if only one hip is sore. Hip rotator stretch    1. Lie on your back with both knees bent and your feet flat on the floor. 2. Put the ankle of your affected leg on your opposite thigh near your knee. 3. Use your hand to gently push your knee away from your body until you feel a gentle stretch around your hip. 4. Hold the stretch for 15 to 30 seconds. 5. Repeat 2 to 4 times. 6. Repeat steps 1 through 5, but this time use your hand to gently pull your knee toward your opposite shoulder. 7. Switch legs and repeat steps 1 through 6, even if only one hip is sore. Knee-to-chest    1. Lie on your back with your knees bent and your feet flat on the floor. 2. Bring your affected leg to your chest, keeping the other foot flat on the floor (or keeping the other leg straight, whichever feels better on your lower back).   3. Keep your lower back pressed to the floor. Hold for at least 15 to 30 seconds. 4. Relax, and lower the knee to the starting position. 5. Repeat 2 to 4 times. 6. Switch legs and repeat steps 1 through 5, even if only one hip is sore. 7. To get more stretch, put your other leg flat on the floor while pulling your knee to your chest.  Clamshell    1. Lie on your side, with your affected hip on top. Keep your feet and knees together and your knees bent. 2. Raise your top knee, but keep your feet together. Do not let your hips roll back. Your legs should open up like a clamshell. 3. Hold for 6 seconds. 4. Slowly lower your knee back down. Rest for 10 seconds. 5. Repeat 8 to 12 times. 6. Switch legs and repeat steps 1 through 5, even if only one hip is sore. Follow-up care is a key part of your treatment and safety. Be sure to make and go to all appointments, and call your doctor if you are having problems. It's also a good idea to know your test results and keep a list of the medicines you take. Where can you learn more? Go to http://ban-ovidio.info/. Enter P084 in the search box to learn more about \"Hip Arthritis: Exercises. \"  Current as of: March 21, 2017  Content Version: 11.3  © 9334-2147 [employer]. Care instructions adapted under license by [employer] (which disclaims liability or warranty for this information). If you have questions about a medical condition or this instruction, always ask your healthcare professional. [place] [address] any warranty or liability for your use of this information.

## 2017-07-20 NOTE — MR AVS SNAPSHOT
Visit Information Date & Time Provider Department Dept. Phone Encounter #  
 7/20/2017  8:45 AM Jakob Roth MD [facility] [phone] Follow-up Instructions Return for Medication follow up. Upcoming Health Maintenance Date Due DTaP/Tdap/Td series (1 - Tdap) 12/7/1965 FOBT Q 1 YEAR AGE 50-75 12/7/1994 ZOSTER VACCINE AGE 60> 12/7/2004 GLAUCOMA SCREENING Q2Y 12/7/2009 Pneumococcal 65+ Low/Medium Risk (1 of 2 - PCV13) 12/7/2009 MEDICARE YEARLY EXAM 12/7/2009 INFLUENZA AGE 9 TO ADULT 8/1/2017 BREAST CANCER SCRN MAMMOGRAM 11/11/2018 Allergies as of 7/20/2017  Review Complete On: 7/20/2017 By: Ana M Kraft Severity Noted Reaction Type Reactions Latex, Natural Rubber  07/14/2014    Itching Lotrel [Amlodipine-benazepril]  07/14/2014    Shortness of Breath Current Immunizations  Never Reviewed No immunizations on file. Not reviewed this visit You Were Diagnosed With   
  
 Codes Comments Other chronic pain    -  Primary ICD-10-CM: G89.29 ICD-9-CM: 338.29 Lumbar spinal stenosis     ICD-10-CM: M48.06 
ICD-9-CM: 724.02 Neuritis     ICD-10-CM: M79.2 ICD-9-CM: 729.2 Cervical facet joint syndrome     ICD-10-CM: M12.88 ICD-9-CM: 723.8 Neuritis of left lower extremity     ICD-10-CM: G57.92 
ICD-9-CM: 355.8 Osteoarthritis of left hip, unspecified osteoarthritis type     ICD-10-CM: M16.12 
ICD-9-CM: 715.95 Vitals BP Pulse Temp Resp OB Status Smoking Status 134/81 74 98.5 °F (36.9 °C) (Oral) 18 Postmenopausal Current Every Day Smoker Preferred Pharmacy Pharmacy Name Phone [phone] [address] [phone] Your Updated Medication List  
  
   
This list is accurate as of: 7/20/17  9:20 AM.  Always use your most recent med list.  
  
  
  
  
 acetaZOLAMIDE  mg capsule Commonly known as:  DIAMOX Take 500 mg by mouth daily. amLODIPine 5 mg tablet Commonly known as:  Veronica Cordial Take 5 mg by mouth daily. aspirin delayed-release 81 mg tablet Take  by mouth daily. atorvastatin 10 mg tablet Commonly known as:  LIPITOR Take 10 mg by mouth daily. gabapentin 300 mg capsule Commonly known as:  NEURONTIN Take 1 Cap by mouth three (3) times daily. Indications: NEUROPATHIC PAIN  
  
 glimepiride 2 mg tablet Commonly known as:  AMARYL Take 2 mg by mouth two (2) times a day. * HYDROcodone-acetaminophen 5-325 mg per tablet Commonly known as:  Neelam Iron 1 po qhs prn severe pain  
  
 * HYDROcodone-acetaminophen 5-325 mg per tablet Commonly known as:  NORCO  
TAKE 1 TAB Q PM PRN FOR SEVERE PAIN  
  
 ketoconazole 2 % topical cream  
Commonly known as:  NIZORAL  
  
 lisinopril 5 mg tablet Commonly known as:  Flor Garcia Take 5 mg by mouth daily. metFORMIN 1,000 mg tablet Commonly known as:  GLUCOPHAGE Take 1,000 mg by mouth two (2) times daily (with meals). omeprazole 20 mg capsule Commonly known as:  PRILOSEC Take 20 mg by mouth daily. traZODone 50 mg tablet Commonly known as:  Ian Lade Take 50 mg by mouth nightly. VENTOLIN HFA 90 mcg/actuation inhaler Generic drug:  albuterol Take 2 Puffs by inhalation every four (4) hours as needed. * Notice: This list has 2 medication(s) that are the same as other medications prescribed for you. Read the directions carefully, and ask your doctor or other care provider to review them with you. Prescriptions Sent to Pharmacy Refills  
 gabapentin (NEURONTIN) 300 mg capsule 2 Sig: Take 1 Cap by mouth three (3) times daily. Indications: NEUROPATHIC PAIN Class: Normal  
 Pharmacy: [facility] [address] Ph #: [phone] Route: Oral  
  
Follow-up Instructions Return for Medication follow up. Patient Instructions Low Back Arthritis: Exercises Your Care Instructions Here are some examples of typical rehabilitation exercises for your condition. Start each exercise slowly. Ease off the exercise if you start to have pain. Your doctor or physical therapist will tell you when you can start these exercises and which ones will work best for you. When you are not being active, find a comfortable position for rest. Some people are comfortable on the floor or a medium-firm bed with a small pillow under their head and another under their knees. Some people prefer to lie on their side with a pillow between their knees. Don't stay in one position for too long. Take short walks (10 to 20 minutes) every 2 to 3 hours. Avoid slopes, hills, and stairs until you feel better. Walk only distances you can manage without pain, especially leg pain. How to do the exercises Pelvic tilt 1. Lie on your back with your knees bent. 2. \"Brace\" your stomachtighten your muscles by pulling in and imagining your belly button moving toward your spine. 3. Press your lower back into the floor. You should feel your hips and pelvis rock back. 4. Hold for 6 seconds while breathing smoothly. 5. Relax and allow your pelvis and hips to rock forward. 6. Repeat 8 to 12 times. Back stretches 1. Get down on your hands and knees on the floor. 2. Relax your head and allow it to droop. Round your back up toward the ceiling until you feel a nice stretch in your upper, middle, and lower back. Hold this stretch for as long as it feels comfortable, or about 15 to 30 seconds. 3. Return to the starting position with a flat back while you are on your hands and knees. 4. Let your back sway by pressing your stomach toward the floor. Lift your buttocks toward the ceiling. 5. Hold this position for 15 to 30 seconds. 6. Repeat 2 to 4 times. Follow-up care is a key part of your treatment and safety. Be sure to make and go to all appointments, and call your doctor if you are having problems. It's also a good idea to know your test results and keep a list of the medicines you take. Where can you learn more? Go to http://ban-ovidio.info/. Enter O161 in the search box to learn more about \"Low Back Arthritis: Exercises. \" Current as of: March 21, 2017 Content Version: 11.3 © 1889-9629 [employer]. Care instructions adapted under license by [employer] (which disclaims liability or warranty for this information). If you have questions about a medical condition or this instruction, always ask your healthcare professional. [address] any warranty or liability for your use of this information. Low Back Arthritis: Exercises Your Care Instructions Here are some examples of typical rehabilitation exercises for your condition. Start each exercise slowly. Ease off the exercise if you start to have pain. Your doctor or physical therapist will tell you when you can start these exercises and which ones will work best for you. When you are not being active, find a comfortable position for rest. Some people are comfortable on the floor or a medium-firm bed with a small pillow under their head and another under their knees. Some people prefer to lie on their side with a pillow between their knees. Don't stay in one position for too long. Take short walks (10 to 20 minutes) every 2 to 3 hours. Avoid slopes, hills, and stairs until you feel better. Walk only distances you can manage without pain, especially leg pain. How to do the exercises Pelvic tilt 7. Lie on your back with your knees bent. 8. \"Brace\" your stomachtighten your muscles by pulling in and imagining your belly button moving toward your spine. 9. Press your lower back into the floor.  You should feel your hips and pelvis rock back. 10. Hold for 6 seconds while breathing smoothly. 11. Relax and allow your pelvis and hips to rock forward. 12. Repeat 8 to 12 times. Back stretches 7. Get down on your hands and knees on the floor. 8. Relax your head and allow it to droop. Round your back up toward the ceiling until you feel a nice stretch in your upper, middle, and lower back. Hold this stretch for as long as it feels comfortable, or about 15 to 30 seconds. 9. Return to the starting position with a flat back while you are on your hands and knees. 10. Let your back sway by pressing your stomach toward the floor. Lift your buttocks toward the ceiling. 11. Hold this position for 15 to 30 seconds. 12. Repeat 2 to 4 times. Follow-up care is a key part of your treatment and safety. Be sure to make and go to all appointments, and call your doctor if you are having problems. It's also a good idea to know your test results and keep a list of the medicines you take. Where can you learn more? Go to http://ban-ovidio.info/. Enter Q217 in the search box to learn more about \"Low Back Arthritis: Exercises. \" Current as of: March 21, 2017 Content Version: 11.3 © 0508-3630 [employer]. Care instructions adapted under license by [employer] (which disclaims liability or warranty for this information). If you have questions about a medical condition or this instruction, always ask your healthcare professional. [place] [address] any warranty or liability for your use of this information. Hip Arthritis: Exercises Your Care Instructions Here are some examples of exercises for hip arthritis. Start each exercise slowly. Ease off the exercise if you start to have pain. Your doctor or physical therapist will tell you when you can start these exercises and which ones will work best for you. How to do the exercises Straight-leg raises to the outside 15. Lie on your side, with your affected hip on top. 14. Tighten the front thigh muscles of your top leg to keep your knee straight. 15. Keep your hip and your leg straight in line with the rest of your body, and keep your knee pointing forward. Do not drop your hip back. 16. Lift your top leg straight up toward the ceiling, about 12 inches off the floor. Hold for about 6 seconds, then slowly lower your leg. 17. Repeat 8 to 12 times. 18. Switch legs and repeat steps 1 through 5, even if only one hip is sore. Straight-leg raises to the inside 13. Lie on your side with your affected hip on the floor. 14. You can either prop up your other leg on a chair, or you can bend that knee and put that foot in front of your other knee. Do not drop your hip back. 15. Tighten the muscles on the front thigh of your bottom leg to straighten that knee. 16. Keep your kneecap pointing forward and your leg straight, and lift your bottom leg up toward the ceiling about 6 inches. Hold for about 6 seconds, then lower slowly. 17. Repeat 8 to 12 times. 18. Switch legs and repeat steps 1 through 5, even if only one hip is sore. Hip hike 1. Stand sideways on the bottom step of a staircase, and hold on to the banister or wall. 2. Keeping both knees straight, lift your good leg off the step and let it hang down. Then hike your good hip up to the same level as your affected hip or a little higher. 3. Repeat 8 to 12 times. 4. Switch legs and repeat steps 1 through 3, even if only one hip is sore. Bridging 1. Lie on your back with both knees bent. Your knees should be bent about 90 degrees. 2. Then push your feet into the floor, squeeze your buttocks, and lift your hips off the floor until your shoulders, hips, and knees are all in a straight line. 3. Hold for about 6 seconds as you continue to breathe normally, and then slowly lower your hips back down to the floor and rest for up to 10 seconds. 4. Repeat 8 to 12 times. Hamstring stretch (lying down) 1. Lie flat on your back with your legs straight. If you feel discomfort in your back, place a small towel roll under your lower back. 2. Holding the back of your affected leg, lift your leg straight up and toward your body until you feel a stretch at the back of your thigh. 3. Hold the stretch for at least 30 seconds. 4. Repeat 2 to 4 times. 5. Switch legs and repeat steps 1 through 4, even if only one hip is sore. Standing quadriceps stretch 1. If you are not steady on your feet, hold on to a chair, counter, or wall. You can also lie on your stomach or your side to do this exercise. 2. Bend the knee of the leg you want to stretch, and reach behind you to grab the front of your foot or ankle with the hand on the same side. For example, if you are stretching your right leg, use your right hand. 3. Keeping your knees next to each other, pull your foot toward your buttock until you feel a gentle stretch across the front of your hip and down the front of your thigh. Your knee should be pointed directly to the ground, and not out to the side. 4. Hold the stretch for at least 15 to 30 seconds. 5. Repeat 2 to 4 times. 6. Switch legs and repeat steps 1 through 5, even if only one hip is sore. Hip rotator stretch 1. Lie on your back with both knees bent and your feet flat on the floor. 2. Put the ankle of your affected leg on your opposite thigh near your knee. 3. Use your hand to gently push your knee away from your body until you feel a gentle stretch around your hip. 4. Hold the stretch for 15 to 30 seconds. 5. Repeat 2 to 4 times. 6. Repeat steps 1 through 5, but this time use your hand to gently pull your knee toward your opposite shoulder. 7. Switch legs and repeat steps 1 through 6, even if only one hip is sore. Knee-to-chest 
 
1. Lie on your back with your knees bent and your feet flat on the floor. 2. Bring your affected leg to your chest, keeping the other foot flat on the floor (or keeping the other leg straight, whichever feels better on your lower back). 3. Keep your lower back pressed to the floor. Hold for at least 15 to 30 seconds. 4. Relax, and lower the knee to the starting position. 5. Repeat 2 to 4 times. 6. Switch legs and repeat steps 1 through 5, even if only one hip is sore. 7. To get more stretch, put your other leg flat on the floor while pulling your knee to your chest. 
Clamshell 1. Lie on your side, with your affected hip on top. Keep your feet and knees together and your knees bent. 2. Raise your top knee, but keep your feet together. Do not let your hips roll back. Your legs should open up like a clamshell. 3. Hold for 6 seconds. 4. Slowly lower your knee back down. Rest for 10 seconds. 5. Repeat 8 to 12 times. 6. Switch legs and repeat steps 1 through 5, even if only one hip is sore. Follow-up care is a key part of your treatment and safety. Be sure to make and go to all appointments, and call your doctor if you are having problems. It's also a good idea to know your test results and keep a list of the medicines you take. Where can you learn more? Go to http://ban-ovidio.info/. Enter P837 in the search box to learn more about \"Hip Arthritis: Exercises. \" Current as of: March 21, 2017 Content Version: 11.3 © 9487-6938 [employer]. Care instructions adapted under license by [employer] (which disclaims liability or warranty for this information). If you have questions about a medical condition or this instruction, always ask your healthcare professional. [place] [address] any warranty or liability for your use of this information. Please provide this summary of care documentation to your next provider. Your primary care clinician is listed as Steph Valdez.  If you have any questions after today's visit, please call [phone].

## 2018-03-27 ENCOUNTER — HOSPITAL ENCOUNTER (OUTPATIENT)
Dept: MRI IMAGING | Age: 73
Discharge: HOME | End: 2018-03-27
Attending: PSYCHIATRY & NEUROLOGY
Payer: MEDICARE

## 2018-03-27 ENCOUNTER — HOSPITAL ENCOUNTER (OUTPATIENT)
Dept: NEUROLOGY | Age: 73
Discharge: HOME | End: 2018-03-27
Attending: PSYCHIATRY & NEUROLOGY
Payer: MEDICARE

## 2018-03-27 DIAGNOSIS — G43.019 HEADACHE, COMMON MIGRAINE, INTRACTABLE: ICD-10-CM

## 2018-03-27 PROCEDURE — 95816 EEG AWAKE AND DROWSY: CPT

## 2018-03-27 PROCEDURE — 95819 EEG AWAKE AND ASLEEP: CPT

## 2018-03-27 PROCEDURE — 70551 MRI BRAIN STEM W/O DYE: CPT

## 2018-03-28 NOTE — PROCEDURES
[facility]  EEG    Name:Seth ECHEVERRIA  MR#: [id number]  : 1945  ACCOUNT #: [de-identified]   DATE OF SERVICE: 2018    ELECTROENCEPHALOGRAM NUMBER:  [facility] . REFERRING PHYSICIAN:  Dr. Vivian Lund. This is an apparently wakeful EEG on this 67year-old patient who is being evaluated for memory loss. Apparently, she has also been having spells where she falls and hits her head. She appears to be confused after the events. MEDICATIONS:  Include Neurontin, Norco, Glucophage, Lipitor, Prinivil, Ventolin, Norvasc, Diamox, trazodone. ELECTROENCEPHALOGRAM REPORT:  The predominant apparently wakeful background consists of 15-20 microvolt, sinusoidal and symmetrical, 8-9 Hz waves which continually go up. Bifrontal 3-5 microvolt, 16-20 Hz activity is appreciated, which is symmetrical in its appearance. During what appears to be drowsiness, the posterior rhythm consists of 10-20 microvolt, 7-8 Hz waves mixed with frequently occurring 10 of 20 microvolt, 4-5 Hz activity. Central vertex sharp waves are identified, which are symmetrical in their occurrence. Step flash photic stimulation was performed with no driving response. IMPRESSION:  This was a normal wakeful and drowsy EEG. No epileptiform or focal abnormality is identified.       MD LOUIE Garcia / Chelsea Calvillo  D: 2018 09:09     T: 2018 09:45  JOB #: [id number]  CC: [id number] MD  [address] [place] [address]

## 2018-12-05 ENCOUNTER — OFFICE VISIT (OUTPATIENT)
Dept: ORTHOPEDIC SURGERY | Age: 73
End: 2018-12-05

## 2018-12-05 VITALS
HEIGHT: 62 IN | RESPIRATION RATE: 16 BRPM | HEART RATE: 69 BPM | OXYGEN SATURATION: 97 % | TEMPERATURE: 97.7 F | SYSTOLIC BLOOD PRESSURE: 160 MMHG | BODY MASS INDEX: 27.34 KG/M2 | DIASTOLIC BLOOD PRESSURE: 96 MMHG

## 2018-12-05 DIAGNOSIS — M47.812 CERVICAL FACET JOINT SYNDROME: ICD-10-CM

## 2018-12-05 DIAGNOSIS — G43.909 MIGRAINE WITHOUT STATUS MIGRAINOSUS, NOT INTRACTABLE, UNSPECIFIED MIGRAINE TYPE: ICD-10-CM

## 2018-12-05 DIAGNOSIS — R26.89 BALANCE PROBLEM: ICD-10-CM

## 2018-12-05 DIAGNOSIS — M48.061 SPINAL STENOSIS OF LUMBAR REGION WITHOUT NEUROGENIC CLAUDICATION: ICD-10-CM
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
## 2018-12-05 DIAGNOSIS — G89.29 OTHER CHRONIC PAIN: ICD-10-CM

## 2018-12-05 RX ORDER — TOPIRAMATE 50 MG/1
100 TABLET, FILM COATED ORAL
COMMUNITY
End: 2020-09-09 | Stop reason: SDDI

## 2018-12-05 RX ORDER — HYDROCODONE BITARTRATE AND ACETAMINOPHEN 5; 325 MG/1; MG/1
TABLET ORAL
Qty: 14 TAB | Refills: 0 | Status: SHIPPED | OUTPATIENT
Start: 2018-12-05

## 2018-12-05 NOTE — PROGRESS NOTES
[address] 
[address] [place] Phone: [phone] Fax: [phone] Pt's YOB: 1945 ASSESSMENT Diagnoses and all orders for this visit: 
 
1. Fall, initial encounter 2. Balance problem 3. Migraine without status migrainosus, not intractable, unspecified migraine type 4. Spinal stenosis of lumbar region without neurogenic claudication 5. Cervical facet joint syndrome 6. Other chronic pain 
-     HYDROcodone-acetaminophen (NORCO) 5-325 mg per tablet; 1 po bid as needed for severe pain IMPRESSION AND PLAN: 
Jim Hicks is a 67 y.o. female with history of chronic lumbar pain. Pt admits that she fell yesterday when she turned to say hello to someone as she was walking to [address] [place] [address]. She was started on Topamax 50 mg 2 tabs QHS by Dr. Daysi Stewart for migraine headaches. Pt takes intermittently Neurontin 300 mg but generally takes 1 tab BID. 1) Pt was given information on lumbar arthritis exercises. 2) She will continue taking Topamax 50 mg as prescribed by Dr. Daysi Stewart. Pt may discontinue the Neurontin. 3) Pt was offered a referral to physical therapy but declined. She wishes to try PT in 01/2019 after the holidays. 4) She received a refill of Norco 5-325 mg 1 tab BID prn severe pain. Pt was instructed to keep her pain medication under lock and griggs. 5) Ms. Savi aVrgas has a reminder for a \"due or due soon\" health maintenance. I have asked that she contact her primary care provider, Dave Alcantara MD, for follow-up on this health maintenance. 6)  demonstrated consistency with prescribing. 7) Pt will follow-up in 6 weeks or sooner if needed. HISTORY OF PRESENT ILLNESS: 
Jim Hicks is a 75 y.o. female with history of chronic lumbar pain and presents to the office today for follow up.  Pt admits that she fell yesterday when she turned to say hello to someone as she was walking to [employer]. She notes that she landed on her right knee and treated this with antibiotic ointment last night. Pt states that she also fell when she was bending over to  leaves in her yard. She denies any weakness in the legs or arms at this time. Pt notes relief when using a lumbar support and notes that she sometimes sleeps with it. She was started on Topamax 50 mg 2 tabs QHS by Dr. Juan Jose Sanchez for migraine headaches. Pt notes significant relief with her headaches but is concerned about taking the medication since it had an extensive list of side effects. Pt denies any side effects with the Topamax at this time. Pt takes Neurontin 300 mg intermittently but generally takes 1 tab BID. She also takes Norco 5-325 mg rarely as her pain warrants. Pt at this time desires to continue with current care. Pain Scale: 8/10 PCP: Jakie Dakin, MD 
  
Past Medical History:  
Diagnosis Date  Arthritis  Diabetes ([facility] [address]   
 takes metformin  GERD (gastroesophageal reflux disease)  High cholesterol  Hypertension  Ill-defined condition   
 high cholesterol  Lumbar pain  Positive PPD CXR neg Social History Socioeconomic History  Marital status: SINGLE Spouse name: Not on file  Number of children: Not on file  Years of education: Not on file  Highest education level: Not on file Social Needs  Financial resource strain: Not on file  Food insecurity - worry: Not on file  Food insecurity - inability: Not on file  Transportation needs - medical: Not on file  Transportation needs - non-medical: Not on file Occupational History  Not on file Tobacco Use  Smoking status: Current Every Day Smoker Packs/day: 0.25 Substance and Sexual Activity  Alcohol use: No  
 Drug use: No  
 Sexual activity: Not on file Other Topics Concern  Not on file Social History Narrative  Not on file Current Outpatient Medications Medication Sig Dispense Refill  topiramate (TOPAMAX) 50 mg tablet Take 100 mg by mouth nightly.  HYDROcodone-acetaminophen (NORCO) 5-325 mg per tablet 1 po bid as needed for severe pain 14 Tab 0  
 multivitamin (ONE A DAY) tablet Take 1 Tab by mouth daily.  losartan (COZAAR) 25 mg tablet Take 25 mg by mouth daily.  gabapentin (NEURONTIN) 300 mg capsule Take 1 Cap by mouth three (3) times daily. Indications: NEUROPATHIC PAIN (Patient taking differently: Take 300 mg by mouth three (3) times daily as needed. Indications: NEUROPATHIC PAIN) 270 Cap 2  
 metFORMIN (GLUCOPHAGE) 1,000 mg tablet Take 1,000 mg by mouth two (2) times daily (with meals).  atorvastatin (LIPITOR) 10 mg tablet Take 10 mg by mouth daily.  aspirin delayed-release 81 mg tablet Take  by mouth daily.  amLODIPine (NORVASC) 5 mg tablet Take 5 mg by mouth daily.  acetaZOLAMIDE SR (DIAMOX) 500 mg capsule Take 500 mg by mouth daily.  traZODone (DESYREL) 50 mg tablet Take 50 mg by mouth nightly. Allergies Allergen Reactions  Latex, Natural Rubber Itching  Lotrel [Amlodipine-Benazepril] Shortness of Breath REVIEW OF SYSTEMS Constitutional: Negative for fever, chills, or weight change. Respiratory: Negative for cough or shortness of breath. Cardiovascular: Negative for chest pain or palpitations. Gastrointestinal: Negative for acid reflux, change in bowel habits, or constipation. Genitourinary: Negative for dysuria and flank pain. Musculoskeletal: Positive for lumbar pain. Skin: Negative for rash. Neurological: Positive for headaches Negative for dizziness or numbness. Endo/Heme/Allergies: Negative for increased bruising. Psychiatric/Behavioral: Negative for difficulty with sleep. PHYSICAL EXAMINATION Visit Vitals BP (!) 160/96 Pulse 69 Temp 97.7 °F (36.5 °C) Resp 16  
 Ht 5' 2\" (1.575 m) SpO2 97% BMI 27.34 kg/m² Constitutional: Awake, alert, and in no acute distress. Neurological: 1+ symmetrical DTRs in the upper extremities. 1+ symmetrical DTRs in the lower extremities. Sensation to light touch is intact. Negative Celine's sign bilaterally. No dysmetria with finger to nose test bilaterally; unable to perform heel to shin test due to pain in the legs. Skin: warm, dry, and intact. Musculoskeletal: Good range of motion in both shoulders. Tight across the upper trapezii with scattered trigger points. Tenderness to palpation in the lower lumbar region. Moderate pain with extension and axial loading. No pain with internal or external rotation of her hips. Negative straight leg raise bilaterally. Biceps  Triceps Deltoids Wrist Ext Wrist Flex Hand Intrin Right 4/5 4/5 4/5 4/5 4/5 4/5 Left 4/5 4/5 4/5 4/5 4/5 4/5 Hip Flex Quads Hamstrings Ankle DF EHL Ankle PF Right 4/5 4/5 4/5 4/5 4/5 4/5 Left 4/5 4/5 4/5 4/5 4/5 4/5 IMAGING: 
 
Lumbar Spine MRI from 02/29/2012 was personally reviewed with the Pt and demonstrated: 
Findings: 
Sagittal images reveal overall normal vertebral body morphology. No  
fractures noted. No suspicious lesions.      
Alignments are anatomic, no evidence for subluxation.   
Conus medullaris ends at the L1  vertebral body level. Correlation of axial and sagittal images reveals the following: 
At L1-L2: No significant disc pathology. No significant facet  
arthropathy. No central canal or foraminal stenosis. At L2-L3: No significant disc pathology. No significant facet  
arthropathy. No central canal or foraminal stenosis. At L3-L4: Mild broad-based disc protrusion. No central canal  
stenosis. Mild facet arthropathy. Mild right foraminal stenosis. At L4-L5: Minimal protrusion of the disc at the posterolateral  
corners. Mild bilateral foraminal stenosis. No central canal  
stenosis. Mild facet arthropathy. At L5-S1: Mild to moderate loss of disc height. Circumferential disc  
osteophyte complex which is most prominent at the posterolateral  
corners. Resultant moderate-to-severe bilateral foraminal stenosis  
in conjunction with mild to moderate facet arthropathy. Mild central  
canal stenosis by measurement. Visualized portions of the sacroiliac joints are unremarkable.   
Incidentally imaged retroperitoneal structures are unremarkable as  
well. IMPRESSION: 
Degenerative changes as above.  
   
Cervical Spine X-rays form 2013 were personally reviewed with the Pt and demonstrated: DISH and degenerative facets. Written by Franky Mei, as dictated by Lianne Goel MD. 
I, Dr. Lianne Goel confirm that all documentation is accurate.

## 2018-12-05 NOTE — PATIENT INSTRUCTIONS
Low Back Arthritis: Exercises Your Care Instructions Here are some examples of typical rehabilitation exercises for your condition. Start each exercise slowly. Ease off the exercise if you start to have pain. Your doctor or physical therapist will tell you when you can start these exercises and which ones will work best for you. When you are not being active, find a comfortable position for rest. Some people are comfortable on the floor or a medium-firm bed with a small pillow under their head and another under their knees. Some people prefer to lie on their side with a pillow between their knees. Don't stay in one position for too long. Take short walks (10 to 20 minutes) every 2 to 3 hours. Avoid slopes, hills, and stairs until you feel better. Walk only distances you can manage without pain, especially leg pain. How to do the exercises Pelvic tilt 1. Lie on your back with your knees bent. 2. \"Brace\" your stomachtighten your muscles by pulling in and imagining your belly button moving toward your spine. 3. Press your lower back into the floor. You should feel your hips and pelvis rock back. 4. Hold for 6 seconds while breathing smoothly. 5. Relax and allow your pelvis and hips to rock forward. 6. Repeat 8 to 12 times. Back stretches 1. Get down on your hands and knees on the floor. 2. Relax your head and allow it to droop. Round your back up toward the ceiling until you feel a nice stretch in your upper, middle, and lower back. Hold this stretch for as long as it feels comfortable, or about 15 to 30 seconds. 3. Return to the starting position with a flat back while you are on your hands and knees. 4. Let your back sway by pressing your stomach toward the floor. Lift your buttocks toward the ceiling. 5. Hold this position for 15 to 30 seconds. 6. Repeat 2 to 4 times. Follow-up care is a key part of your treatment and safety.  Be sure to make and go to all appointments, and call your doctor if you are having problems. It's also a good idea to know your test results and keep a list of the medicines you take. Where can you learn more? Go to http://ban-ovidio.info/. Enter E370 in the search box to learn more about \"Low Back Arthritis: Exercises. \" Current as of: November 29, 2017 Content Version: 11.8 © 4113-7407 [employer]wise, [employer]. Care instructions adapted under license by [employer] (which disclaims liability or warranty for this information). If you have questions about a medical condition or this instruction, always ask your healthcare professional. [address] any warranty or liability for your use of this information.

## 2019-05-21 ENCOUNTER — OFFICE VISIT (OUTPATIENT)
Dept: NEUROLOGY | Age: 74
End: 2019-05-21

## 2019-05-21 VITALS
HEART RATE: 70 BPM | HEIGHT: 62 IN | OXYGEN SATURATION: 96 % | DIASTOLIC BLOOD PRESSURE: 80 MMHG | SYSTOLIC BLOOD PRESSURE: 138 MMHG | RESPIRATION RATE: 14 BRPM | BODY MASS INDEX: 27.05 KG/M2 | TEMPERATURE: 98.2 F | WEIGHT: 147 LBS

## 2019-05-21 DIAGNOSIS — G93.2 PSEUDOTUMOR CEREBRI: ICD-10-CM

## 2019-05-21 DIAGNOSIS — G31.84 MCI (MILD COGNITIVE IMPAIRMENT): ICD-10-CM

## 2019-05-21 DIAGNOSIS — G43.019 HEADACHE, COMMON MIGRAINE, INTRACTABLE: Primary | ICD-10-CM

## 2019-05-21 NOTE — PROGRESS NOTES
Serafin Springer is a 75 y.o., right handed female, with an established history of hypertension and diabetes, who comes in for evaluation and follow up of hydrocephalus. She was previously seeing Wilda Carson. She apparently was diagnosed in the past with pseudotumor cerebri. She has headaches fairly regularly, but no more than once a month. She gets a sharp bitemporal headache that lasts minutes, no more than 5 minutes. She also says she gets vision blurring with the headaches. She also noted that her memory has deteriorated as well as her penmanship. This has been ongoing for about 12 months or more. There's no difficulty with walking. There's no incontinence. She was given Topamax for the headaches. Social History; she's single, lives alone. She doesn't drink, smokes 4-5 cigarettes daily. Retired from nursing. Family History; mother  from pancreatic cancer. Father  from COPD. Siblings hypertension, stroke, diabetes. Current Outpatient Medications   Medication Sig Dispense Refill    HYDROcodone-acetaminophen (NORCO) 5-325 mg per tablet 1 po bid as needed for severe pain 14 Tab 0    multivitamin (ONE A DAY) tablet Take 1 Tab by mouth daily.  losartan (COZAAR) 25 mg tablet Take 25 mg by mouth daily.  gabapentin (NEURONTIN) 300 mg capsule Take 1 Cap by mouth three (3) times daily. Indications: NEUROPATHIC PAIN (Patient taking differently: Take 500 mg by mouth three (3) times daily as needed. Indications: Neuropathic Pain) 270 Cap 2    metFORMIN (GLUCOPHAGE) 1,000 mg tablet Take 1,000 mg by mouth two (2) times daily (with meals).  atorvastatin (LIPITOR) 10 mg tablet Take 10 mg by mouth daily.  aspirin delayed-release 81 mg tablet Take  by mouth daily.  amLODIPine (NORVASC) 5 mg tablet Take 5 mg by mouth daily.  acetaZOLAMIDE SR (DIAMOX) 500 mg capsule Take 500 mg by mouth daily.  traZODone (DESYREL) 50 mg tablet Take 50 mg by mouth nightly.  topiramate (TOPAMAX) 50 mg tablet Take 100 mg by mouth nightly.          Past Medical History:   Diagnosis Date    Arthritis     Diabetes ([address]     takes metformin    GERD (gastroesophageal reflux disease)     High cholesterol     Hypertension     Ill-defined condition     high cholesterol    Lumbar pain     Positive PPD     CXR neg       Past Surgical History:   Procedure Laterality Date    COLONOSCOPY N/A 6/19/2018    COLONOSCOPY, DIAGNOSTIC with polypectomy  performed by Haley Mei MD at [address] [place] [address] HX ENDOSCOPY      HX HYSTERECTOMY      HX OVARIAN CYST REMOVAL N/A        Allergies   Allergen Reactions    Latex, Natural Rubber Itching    Lotrel [Amlodipine-Benazepril] Shortness of Breath       Patient Active Problem List   Diagnosis Code    Abdominal pain, other specified site R10.9    Weight loss, abnormal R63.4    Diffuse idiopathic skeletal hyperostosis M48.10    Myofascial pain M79.18    Osteopenia M85.80    Cervical facet joint syndrome M47.812    Chronic pain G89.29    Therapeutic drug monitoring Z51.81         Review of Systems:   As above otherwise 11 point review of systems negative including;   Constitutional no fever or chills  Skin denies rash or itching  HENT  Denies tinnitus, hearing lose  Eyes denies diplopia vision lose  Respiratory denies shortness of breath  Cardiovascular denies chest pain, dyspnea on exertion  Gastrointestinal denies nausea, vomiting, diarrhea, constipation  Genitourinary denies incontinence  Musculoskeletal denies joint pain or swelling  Endocrine denies weight change  Hematology denies easy bruising or bleeding   Neurological as above in HPI      PHYSICAL EXAMINATION:      VITAL SIGNS:    Visit Vitals  /80 (BP 1 Location: Left arm, BP Patient Position: Sitting)   Pulse 70   Temp 98.2 °F (36.8 °C)   Resp 14   Ht 5' 2\" (1.575 m)   Wt 66.7 kg (147 lb)   SpO2 96%   BMI 26.89 kg/m²       GENERAL: The patient is well developed, well nourished, and in no apparent distress. EXTREMITIES: No clubbing, cyanosis, or edema is identified. Pulses 2+ and symmetrical.  Muscle tone is normal.  HEAD:   Ear, nose, and throat appear to be without trauma. The patient is normocephalic. NEUROLOGIC EXAMINATION    MENTAL STATUS: The patient is awake, alert, and oriented x 4. Fund of knowledge is adequate. Speech is fluent and memory appears to be intact, both long and short term. She scored 27/30 on the MMSE. CRANIAL NERVES: II  Visual fields are full to confrontation. Funduscopic examination reveals flat disks bilaterally. Pupils are both 2 mm and briskly reactive to light and accommodation. III, IV, VI  Extraocular movements are intact and there is no nystagmus. V  Facial sensation is intact to pinprick and light touch. VII  Face is symmetrical.   VIII - Hearing is present. IX, X, [address] rises symmetrically. Gag is present. Tongue is in the midline. XI - Shoulder shrugging and head turning intact  MOTOR:  The patient is 5/5 in all four limbs without any drift. Fine finger movements are symmetrical.  Isolated motor group testing reveals no focal abnormalities. Tone is normal.  Sensory examination is intact to pinprick, light touch and position sense testing. Reflexes are 2+ and symmetrical. Plantars are down going. Cerebellar examination reveals no gross ataxia or dysmetria. Gait is normal and the patient can tandem walk without any difficulty. Final result (Exam End: 3/27/2018 08:47) Provider Status: Open   Study Result     Brain MR without contrast     History: Memory loss, syncope, intractable headaches.  Also with some episodic  falling.     Comparison: Prior MRI most recently January 2016     Technique: Brain scanned with axial and sagittal T1W scans, axial T2W scans,  axial FLAIR, axial T2*GRE heme sensitive sequence, axial diffusion weighted  images.      Findings:     Redemonstration of a mild amount of chronic small caliber white matter disease,  best appreciated at the high bifrontal regions. Pattern unchanged. Diffusion  imaging is normal. No acute infarction identified. No hemorrhage identified. No  mass lesion identified.      Ventricles and cisterns are proportional in caliber, remain midline in position. Parasellar and IAC regions are unremarkable. Patent flow-voids of major  skullbase vasculature. No acute finding of orbits. Chronic herniation of right  inferior intraorbital fat into the maxillary sinus; present since at least 2006. The paranasal sinuses are clear. The cervicomedullary junction is unremarkable.           IMPRESSION  IMPRESSION[de-identified]     1. No acute intracranial hemorrhage or territorial infarct. No mass effect. 2. Stable minimal chronic white matter disease, nonspecific but typically small  vessel ischemic sequelae. 3. Chronic right orbital fat herniation.     Thank you for this referral.         I have reviewed the above imagines myself. CBC:   Lab Results   Component Value Date/Time    WBC 7.7 03/21/2017 09:15 AM    RBC 4.03 (L) 03/21/2017 09:15 AM    HGB 13.4 03/21/2017 09:15 AM    HCT 40.3 03/21/2017 09:15 AM    PLATELET 014 56/14/5447 09:15 AM     BMP:   Lab Results   Component Value Date/Time    Glucose 123 (H) 10/22/2010 08:56 AM    Sodium 138 10/22/2010 08:56 AM    Potassium 3.6 10/22/2010 08:56 AM    Chloride 105 10/22/2010 08:56 AM    CO2 27 10/22/2010 08:56 AM    BUN 9 10/22/2010 08:56 AM    Creatinine 0.8 10/22/2010 08:56 AM    Calcium 8.5 10/22/2010 08:56 AM     CMP:   Lab Results   Component Value Date/Time    Glucose 123 (H) 10/22/2010 08:56 AM    Sodium 138 10/22/2010 08:56 AM    Potassium 3.6 10/22/2010 08:56 AM    Chloride 105 10/22/2010 08:56 AM    CO2 27 10/22/2010 08:56 AM    BUN 9 10/22/2010 08:56 AM    Creatinine 0.8 10/22/2010 08:56 AM    Calcium 8.5 10/22/2010 08:56 AM    Anion gap 6 10/22/2010 08:56 AM    BUN/Creatinine ratio 11 (L) 10/22/2010 08:56 AM    Alk. phosphatase 136 10/22/2010 08:56 AM    Protein, total 6.5 10/22/2010 08:56 AM    Albumin 3.6 10/22/2010 08:56 AM    Globulin 2.9 10/22/2010 08:56 AM    A-G Ratio 1.2 10/22/2010 08:56 AM     Coagulation: No results found for: PTP, INR, APTT, PTTT  Cardiac markers:   Lab Results   Component Value Date/Time    CK 92 02/28/2010 06:11 PM    CK-MB Index 1.0 02/28/2010 06:11 PM          Impression: Patient with an established history of pseudotumor cerebra 3 but also with complaints of memory problem. She is a poor historian so is difficult to get a clear history from her. She currently seems to be essentially headache free except for a fleeting headache that plagues her about once or twice a month. Plan: At the current time do not feel the need to take her off her Diamox. She seems to be doing very well on the medication and she describes having a lumbar puncture which established the diagnosis some years ago. These fleeting headaches that she gets, or so quick that they do not seem long enough to be able to be treated with abortive therapy. Also makes little and no sense to treat with prophylactic care for headache that is no more than 10 or 15 minutes a month. I reassured her that this is not anything to worry about and that she will be okay. In terms of her memory, she scored a 27 out of 30 on the Mini-Mental Status Examination. I suspect she has early memory loss or MCI. I offered her from some neuropsychological testing to better ascertain the level of hemorrhage or problems she has, but she refuses at this time. I will keep an eye on that. In terms of further care, I need to get Dr. Ethan Carbajal old records to just review them. No change in her medications and I will see her back here in about 3 months. PLEASE NOTE:   This document has been produced using voice recognition software. Unrecognized errors in transcription may be present.

## 2019-05-21 NOTE — PATIENT INSTRUCTIONS

## 2019-05-21 NOTE — LETTER
5/21/19 Patient: Leah Gutierrez YOB: 1945 Date of Visit: 5/21/2019 Jesse Will MD 
[address] [place] [address] VIA Facsimile: [phone] Dear Jesse Will MD, Thank you for referring Ms. Del Dorman to [facility] for evaluation. My notes for this consultation are attached. If you have questions, please do not hesitate to call me. I look forward to following your patient along with you.  
 
 
Sincerely, 
 
Onel Osborn MD

## 2019-08-21 ENCOUNTER — OFFICE VISIT (OUTPATIENT)
Dept: NEUROLOGY | Age: 74
End: 2019-08-21

## 2019-08-21 VITALS
HEART RATE: 84 BPM | RESPIRATION RATE: 18 BRPM | SYSTOLIC BLOOD PRESSURE: 120 MMHG | DIASTOLIC BLOOD PRESSURE: 60 MMHG | BODY MASS INDEX: 27.05 KG/M2 | HEIGHT: 62 IN | TEMPERATURE: 98.2 F | WEIGHT: 147 LBS | OXYGEN SATURATION: 98 %

## 2019-08-21 DIAGNOSIS — G93.2 PSEUDOTUMOR CEREBRI: ICD-10-CM

## 2019-08-21 DIAGNOSIS — R41.3 MEMORY LOSS: Primary | ICD-10-CM

## 2019-08-21 NOTE — PROGRESS NOTES
Chief Complaint   Patient presents with    Headache     follow up     3 most recent PHQ Screens 8/21/2019   Little interest or pleasure in doing things Not at all   Feeling down, depressed, irritable, or hopeless Several days   Total Score PHQ 2 1     Fall Risk Assessment, last 12 mths 8/21/2019   Able to walk? Yes   Fall in past 12 months? Yes   Fall with injury?  No   Number of falls in past 12 months 3   Fall Risk Score 3

## 2019-08-21 NOTE — PATIENT INSTRUCTIONS
Preventing Falls: After Your Visit   Your Care Instructions   Getting around your home safely can be a challenge if you have injuries or health problems that make it easy for you to fall. Loose rugs and furniture in walkways are among the dangers for many older people who have problems walking or who have poor eyesight. People who have conditions such as arthritis, osteoporosis, or dementia also have to be careful not to fall. You can make your home safer with a few simple measures. Follow-up care is a key part of your treatment and safety. Be sure to make and go to all appointments, and call your doctor if you are having problems. It's also a good idea to know your test results and keep a list of the medicines you take. How can you care for yourself at home? Taking care of yourself   You may get dizzy if you do not drink enough water. To prevent dehydration, drink plenty of fluids, enough so that your urine is light yellow or clear like water. Choose water and other caffeine-free clear liquids. If you have kidney, heart, or liver disease and have to limit fluids, talk with your doctor before you increase the amount of fluids you drink. Exercise regularly to improve your strength, muscle tone, and balance. Walk if you can. Swimming may be a good choice if you cannot walk easily. Have your vision and hearing checked each year or any time you notice a change. If you have trouble seeing and hearing, you might not be able to avoid objects and could lose your balance. Know the side effects of the medicines you take. Ask your doctor or pharmacist whether the medicines you take can affect your balance. Sleeping pills or sedatives can affect your balance. Limit the amount of alcohol you drink. Alcohol can impair your balance and other senses. Ask your doctor whether calluses or corns on your feet need to be removed.  If you wear loose-fitting shoes because of calluses or corns, you can lose your balance and fall.   Talk to your doctor if you have numbness in your feet. Preventing falls at home   Remove raised doorway thresholds, throw rugs, and clutter. Repair loose carpet or raised areas in the floor. Move furniture and electrical cords to keep them out of walking paths. Use nonskid floor wax, and wipe up spills right away, especially on ceramic tile floors. If you use a walker or cane, put rubber tips on it. If you use crutches, clean the bottoms of them regularly with an abrasive pad, such as steel wool. Keep your house well lit, especially stairways, porches, and outside walkways. Use night-lights in areas such as hallways and bathrooms. Add extra light switches or use remote switches (such as switches that go on or off when you clap your hands) to make it easier to turn lights on if you have to get up during the night. Install sturdy handrails on stairways. Move items in your cabinets so that the things you use a lot are on the lower shelves (about waist level). Keep a cordless phone and a flashlight with new batteries by your bed. If possible, put a phone in each of the main rooms of your house, or carry a cell phone in case you fall and cannot reach a phone. Or, you can wear a device around your neck or wrist. You push a button that sends a signal for help. Wear low-heeled shoes that fit well and give your feet good support. Use footwear with nonskid soles. Check the heels and soles of your shoes for wear. Repair or replace worn heels or soles. Do not wear socks without shoes on wood floors. Walk on the grass when the sidewalks are slippery. If you live in an area that gets snow and ice in the winter, sprinkle salt on slippery steps and sidewalks. Preventing falls in the bath   Install grab bars and nonskid mats inside and outside your shower or tub and near the toilet and sinks. Use shower chairs and bath benches. Use a hand-held shower head that will allow you to sit while showering. Get into a tub or shower by putting the weaker leg in first. Get out of a tub or shower with your strong side first.   Repair loose toilet seats and consider installing a raised toilet seat to make getting on and off the toilet easier. Keep your bathroom door unlocked while you are in the shower. Where can you learn more? Go to [employer]   Enter G117 in the search box to learn more about \"Preventing Falls: After Your Visit. \"    © 8954-8569 [employer]wise, Incorporated. Care instructions adapted under license by [facility] (which disclaims liability or warranty for this information). This care instruction is for use with your licensed healthcare professional. If you have questions about a medical condition or this instruction, always ask your healthcare professional. [address] any warranty or liability for your use of this information. Content Version: 10.1.151362; Current as of: May 15, 2013              Thank you for choosing [facility] for your     care. You may receive a survey about your visit. We appreciate you taking time     to complete this survey as we use your feedback to improve our services. We     realize we are not perfect, but we strive to provide excellent care.

## 2019-08-21 NOTE — PROGRESS NOTES
[facility]  [address], [address], [place], [address]  [address], [address]  Office:  [phone]  Fax: [phone]  Chief Complaint   Patient presents with    Headache     follow up     This is a 48-year-old female who presents for follow-up headaches. Last seen in office by Dr. Fitz Quan in May. Since then she has stopped the Topamax. Remains on Diamox. Per documentation previously diagnosed with pseudotumor cerebri by lumbar puncture. Patient says this was years ago. She endorses stopping Topamax due to side effects. Endorses headaches remain the same since stopping Topamax. Headaches come and go. Headaches do not last long. Endorses history of cataracts and needs to follow-up with the ophthalmologist.  Denies focal deficits. Endorses one incidence where she went grocery shopping on a Friday when she usually does on a Saturday. She came home that Friday and she thought it was a Saturday. Otherwise denies dangerous behaviors. She says that she lives at home alone. She pays her own bills and denies forgetting these. Endorses a family history of memory loss. She says that her maternal aunt had Alzheimer's disease. She does not drive. No other concerns at this time. Past Medical History:   Diagnosis Date    Arthritis     Diabetes ([address]     takes metformin    GERD (gastroesophageal reflux disease)     High cholesterol     Hypertension     Ill-defined condition     high cholesterol    Lumbar pain     Positive PPD     CXR neg       Past Surgical History:   Procedure Laterality Date    COLONOSCOPY N/A 6/19/2018    COLONOSCOPY, DIAGNOSTIC with polypectomy  performed by Tomeka Grajeda MD at [address] [place] [address] HX ENDOSCOPY      HX HYSTERECTOMY      HX OVARIAN CYST REMOVAL N/A        Current Outpatient Medications   Medication Sig Dispense Refill    multivitamin (ONE A DAY) tablet Take 1 Tab by mouth daily.       losartan (COZAAR) 25 mg tablet Take 25 mg by mouth daily.  gabapentin (NEURONTIN) 300 mg capsule Take 1 Cap by mouth three (3) times daily. Indications: NEUROPATHIC PAIN (Patient taking differently: Take 500 mg by mouth three (3) times daily as needed. Indications: Neuropathic Pain) 270 Cap 2    metFORMIN (GLUCOPHAGE) 1,000 mg tablet Take 1,000 mg by mouth two (2) times daily (with meals).  atorvastatin (LIPITOR) 10 mg tablet Take 10 mg by mouth daily.  aspirin delayed-release 81 mg tablet Take  by mouth daily.  amLODIPine (NORVASC) 5 mg tablet Take 5 mg by mouth daily.  acetaZOLAMIDE SR (DIAMOX) 500 mg capsule Take 500 mg by mouth daily.  traZODone (DESYREL) 50 mg tablet Take 50 mg by mouth nightly.  topiramate (TOPAMAX) 50 mg tablet Take 100 mg by mouth nightly.  HYDROcodone-acetaminophen (NORCO) 5-325 mg per tablet 1 po bid as needed for severe pain 14 Tab 0        Allergies   Allergen Reactions    Latex, Natural Rubber Itching    Lotrel [Amlodipine-Benazepril] Shortness of Breath       Social History     Tobacco Use    Smoking status: Current Every Day Smoker     Packs/day: 0.25    Smokeless tobacco: Never Used   Substance Use Topics    Alcohol use: No    Drug use: No       Family History   Problem Relation Age of Onset    Cancer Mother         pancreatic    Cancer Sister         breast    Arthritis-osteo Sister     Breast Cancer Sister        Review of Systems  GENERAL: Denies fever or fatigue  CARDIAC: No CP or SOB  PULMONARY: No cough of SOB  MUSCULOSKELETAL: No new joint pain  NEURO: SEE HPI    Examination  Visit Vitals  /60 (BP 1 Location: Left arm, BP Patient Position: Sitting)   Pulse 84   Temp 98.2 °F (36.8 °C)   Resp 18   Ht 5' 2\" (1.575 m)   Wt 66.7 kg (147 lb)   SpO2 98%   BMI 26.89 kg/m²       This is a very pleasant 69-year-old female. She is alert and in no apparent distress. Fund of knowledge is adequate. Speech is clear. No abnormal movements.   Freely move the upper and lower extremities. No signs of incoordination or ataxia. Steady gait. Impression/Plan  Vimal Britton is a 75 y.o. female whose history and physical are consistent with pseudotumor cerebri and memory loss. Per record review saw Dr. Meghann Coello back in 2012 and reports history of pseudotumor cerebri with management on Diamox. In the interim patient has discontinued topiramate due to side effects. Headaches remain about the same. They come and go and do not last long. Denies taking anything over-the-counter for headaches. Endorses memory loss. She says that one day she went grocery shopping in a Friday which she typically will go on a Saturday. She came home later that Friday afternoon and thought it was Saturday. She denies dangerous behaviors such as wandering or getting in traffic accidents. She denies driving. She does live at home alone and pays her own bills. I would like to place a referral to Dr. Mely Sharma for formal memory evaluation. Discussed the importance of doing something daily to keep her mind active such as reading and doing puzzles. We will follow-up after results the recommendation are complete from memory evaluation. Continue on Diamox. No medication adjustments. All questions addressed the patient is agreeable with plan of care. Diagnoses and all orders for this visit:    1. Memory loss  -     REFERRAL TO NEUROPSYCHOLOGY    2. Pseudotumor cerebri      Total time: 30 min   Counseling / coordination time: 25 min   > 50% counseling / coordination?: Yes re: symptoms, management, chart review, medications, answering questions, and plan of care. Signed By: Ciera Gonzalez NP    This note will not be viewable in [address] PLEASE NOTE:   Portions of this document may have been produced using voice recognition software. Unrecognized errors in transcription may be present.

## 2019-10-02 ENCOUNTER — OFFICE VISIT (OUTPATIENT)
Dept: ORTHOPEDIC SURGERY | Age: 74
End: 2019-10-02

## 2019-10-02 VITALS
WEIGHT: 145.6 LBS | OXYGEN SATURATION: 97 % | RESPIRATION RATE: 16 BRPM | HEART RATE: 63 BPM | DIASTOLIC BLOOD PRESSURE: 65 MMHG | HEIGHT: 62 IN | BODY MASS INDEX: 26.79 KG/M2 | SYSTOLIC BLOOD PRESSURE: 134 MMHG | TEMPERATURE: 98.1 F

## 2019-10-02 DIAGNOSIS — M54.50 LUMBAR PAIN: Primary | ICD-10-CM

## 2019-10-02 DIAGNOSIS — M51.36 DDD (DEGENERATIVE DISC DISEASE), LUMBAR: ICD-10-CM

## 2019-10-02 DIAGNOSIS — M16.12 PRIMARY OSTEOARTHRITIS OF LEFT HIP: ICD-10-CM

## 2019-10-02 DIAGNOSIS — R26.9 GAIT ABNORMALITY: ICD-10-CM

## 2019-10-02 DIAGNOSIS — M47.816 LUMBAR FACET ARTHROPATHY: ICD-10-CM

## 2019-10-02 DIAGNOSIS — M48.061 SPINAL STENOSIS OF LUMBAR REGION WITHOUT NEUROGENIC CLAUDICATION: ICD-10-CM

## 2019-10-02 DIAGNOSIS — M25.552 LEFT HIP PAIN: ICD-10-CM

## 2019-10-02 DIAGNOSIS — M62.838 MUSCLE SPASM: ICD-10-CM

## 2019-10-02 RX ORDER — HYDROCODONE BITARTRATE AND ACETAMINOPHEN 5; 325 MG/1; MG/1
1 TABLET ORAL
Qty: 12 TAB | Refills: 0 | Status: SHIPPED | OUTPATIENT
Start: 2019-10-02 | End: 2019-10-09

## 2019-10-02 RX ORDER — TIZANIDINE 4 MG/1
4 TABLET ORAL
Qty: 30 TAB | Refills: 1 | Status: SHIPPED | OUTPATIENT
Start: 2019-10-02

## 2019-10-02 NOTE — PROGRESS NOTES
[facility]  [address] [place], [address]  Phone: [phone]  Fax: [phone]    Pt's YOB: 1945    ASSESSMENT   Diagnoses and all orders for this visit:    1. Lumbar pain  -     AMB POC XRAY, SPINE, LUMBOSACRAL; 4+    2. Left hip pain  -     AMB POC X-RAY RADEX HIP UNI WITH PELVIS 2-3 VIEWS  -     HYDROcodone-acetaminophen (NORCO) 5-325 mg per tablet; Take 1 Tab by mouth two (2) times daily as needed for Pain for up to 7 days. Max Daily Amount: 2 Tabs. 3. Lumbar facet arthropathy  -     HYDROcodone-acetaminophen (NORCO) 5-325 mg per tablet; Take 1 Tab by mouth two (2) times daily as needed for Pain for up to 7 days. Max Daily Amount: 2 Tabs. 4. Muscle spasm  -     tiZANidine (ZANAFLEX) 4 mg tablet; Take 1 Tab by mouth two (2) times daily as needed (muscle spasm). 5. Primary osteoarthritis of left hip    6. DDD (degenerative disc disease), lumbar    7. Spinal stenosis of lumbar region without neurogenic claudication    8. Gait abnormality         IMPRESSION AND PLAN:  Cornel Ellis is a 75 y.o. female with history of chronic lumbar pain. Pt complains of pain in the left hip/lower back that radiates down the left leg. She reports an occasional locking sensation in the lower back. Pt notes that she likes to stay active by working in her yard. She takes Norco 5-325 mg very rarely as her pain warrants with benefit. 1) Pt was given information on lumbar and hip arthritis exercises. 2) She received 12 tabs of Norco 5-325 mg 1 tab BID prn pain. 3) Pt was prescribed Zanaflex 4 mg 1 tab BID prn muscle spasm. 4) She was instructed to keep her pain medication secure under lock and key. 5) Pt was offered a referral to physical therapy but declined. 6) Ms. Viry Osman has a reminder for a \"due or due soon\" health maintenance.  I have asked that she contact her primary care provider, Oniel Walsh MD, for follow-up on this health maintenance. 7)  demonstrated consistency with prescribing. 8) Last UDS from 04/26/2017 was consistent. Follow-up and Dispositions    · Return in about 6 months (around 4/2/2020) for follow up. HISTORY OF PRESENT ILLNESS:  Grace Mazariegos is a 75 y.o. female with history of chronic lumbar pain and was last seen on 12/05/2018. Pt complains of pain in the left hip/lower back that radiates down the left leg. She reports an occasional locking sensation in the lower back. Pt denies any falls but notes that she had severe pain last week. She notes that at the time she had pain in the left hip/leg with any activity and difficulty sitting secondary to pain. Pt notes that she had limited range of motion in the left hip but notes significant overall improvement in her symptoms over the last week. She uses heat as needed with benefit. Pt notes that she likes to stay active by working in her yard. She takes Norco 5-325 mg very rarely as her pain warrants with benefit. Pt notes that she self discontinued the Topamax 50 mg since her last office and is no longer followed by Dr. Emma Milner. Pt is now followed by Dr. Ac Mcgraw and was told she did not need to take the Topamax since she does not have regularly headaches. Pt at this time desires to continue with current care.      Pain Scale: 10 - Worst pain ever/10    PCP: Tricia Gutierrez MD     Past Medical History:   Diagnosis Date    Arthritis     Diabetes ([address]     takes metformin    GERD (gastroesophageal reflux disease)     High cholesterol     Hypertension     Ill-defined condition     high cholesterol    Lumbar pain     Positive PPD     CXR neg        Social History     Socioeconomic History    Marital status: SINGLE     Spouse name: Not on file    Number of children: Not on file    Years of education: Not on file    Highest education level: Not on file   Occupational History    Not on file   Social Needs    Financial resource strain: Not on file    Food insecurity:     Worry: Not on file     Inability: Not on file    Transportation needs:     Medical: Not on file     Non-medical: Not on file   Tobacco Use    Smoking status: Current Every Day Smoker     Packs/day: 0.25    Smokeless tobacco: Never Used   Substance and Sexual Activity    Alcohol use: No    Drug use: No    Sexual activity: Not on file   Lifestyle    Physical activity:     Days per week: Not on file     Minutes per session: Not on file    Stress: Not on file   Relationships    Social connections:     Talks on phone: Not on file     Gets together: Not on file     Attends Yarsani service: Not on file     Active member of club or organization: Not on file     Attends meetings of clubs or organizations: Not on file     Relationship status: Not on file    Intimate partner violence:     Fear of current or ex partner: Not on file     Emotionally abused: Not on file     Physically abused: Not on file     Forced sexual activity: Not on file   Other Topics Concern    Not on file   Social History Narrative    Not on file       Current Outpatient Medications   Medication Sig Dispense Refill    HYDROcodone-acetaminophen (NORCO) 5-325 mg per tablet Take 1 Tab by mouth two (2) times daily as needed for Pain for up to 7 days. Max Daily Amount: 2 Tabs. 12 Tab 0    tiZANidine (ZANAFLEX) 4 mg tablet Take 1 Tab by mouth two (2) times daily as needed (muscle spasm). 30 Tab 1    topiramate (TOPAMAX) 50 mg tablet Take 100 mg by mouth nightly.  multivitamin (ONE A DAY) tablet Take 1 Tab by mouth daily.  losartan (COZAAR) 25 mg tablet Take 25 mg by mouth daily.  gabapentin (NEURONTIN) 300 mg capsule Take 1 Cap by mouth three (3) times daily. Indications: NEUROPATHIC PAIN (Patient taking differently: Take 500 mg by mouth three (3) times daily as needed.  Indications: Neuropathic Pain) 270 Cap 2    metFORMIN (GLUCOPHAGE) 1,000 mg tablet Take 1,000 mg by mouth two (2) times daily (with meals).  atorvastatin (LIPITOR) 10 mg tablet Take 10 mg by mouth daily.  aspirin delayed-release 81 mg tablet Take  by mouth daily.  amLODIPine (NORVASC) 5 mg tablet Take 5 mg by mouth daily.  acetaZOLAMIDE SR (DIAMOX) 500 mg capsule Take 500 mg by mouth daily.  traZODone (DESYREL) 50 mg tablet Take 50 mg by mouth nightly.  HYDROcodone-acetaminophen (NORCO) 5-325 mg per tablet 1 po bid as needed for severe pain 14 Tab 0       Allergies   Allergen Reactions    Latex, Natural Rubber Itching    Lotrel [Amlodipine-Benazepril] Shortness of Breath         REVIEW OF SYSTEMS    Constitutional: Negative for fever, chills, or weight change. Respiratory: Negative for cough or shortness of breath. Cardiovascular: Negative for chest pain or palpitations. Gastrointestinal: Negative for acid reflux, change in bowel habits, or constipation. Genitourinary: Negative for dysuria and flank pain. Musculoskeletal: Positive for lumbar and hip pain. Skin: Negative for rash. Neurological: Negative for headaches, dizziness, or numbness. Endo/Heme/Allergies: Negative for increased bruising. Psychiatric/Behavioral: Negative for difficulty with sleep. PHYSICAL EXAMINATION  Visit Vitals  /65   Pulse 63   Temp 98.1 °F (36.7 °C) (Oral)   Resp 16   Ht 5' 2\" (1.575 m)   Wt 145 lb 9.6 oz (66 kg)   SpO2 97%   BMI 26.63 kg/m²       Constitutional: Awake, alert, and in no acute distress. Neurological: 1+ symmetrical DTRs in the upper extremities. 1+ symmetrical DTRs in the lower extremities. Sensation to light touch is intact. Negative Mock's sign bilaterally. Skin: warm, dry, and intact. Musculoskeletal: Tenderness to palpation in the lower lumbar region. Moderate pain with extension and axial loading. No pain with internal or external rotation of her hips. Negative straight leg raise bilaterally.       Biceps  Triceps Deltoids Wrist Ext Wrist Flex Hand Intrin   Right +4/5 +4/5 +4/5 +4/5 +4/5 +4/5   Left +4/5 +4/5 +4/5 +4/5 +4/5 +4/5      Hip Flex Quads Hamstrings Ankle DF EHL Ankle PF   Right 4/5 4/5 4/5 4/5 4/5 4/5   Left 4/5 4/5 4/5 4/5 4/5 4/5       IMAGING:    Lumbar spine x-rays from 01/02/2019 were personally reviewed with the Pt and demonstrated:  Severe degenerative disc at L5-S1 with listhesis at L4-5. Mild atherosclerosis and severe multilevel degenerative facets. Hip x-rays from 01/02/2019 were personally reviewed with the Pt and demonstrated:  Severe degenerative joint findings bilaterally. Lumbar spine MRI from 02/29/2012 was personally reviewed with the Pt and demonstrated:  Findings:  Sagittal images reveal overall normal vertebral body morphology. No   fractures noted. No suspicious lesions.       Alignments are anatomic, no evidence for subluxation.    Conus medullaris ends at the L1  vertebral body level. Correlation of axial and sagittal images reveals the following:  At L1-L2: No significant disc pathology. No significant facet   arthropathy. No central canal or foraminal stenosis. At L2-L3: No significant disc pathology. No significant facet   arthropathy. No central canal or foraminal stenosis. At L3-L4: Mild broad-based disc protrusion. No central canal   stenosis. Mild facet arthropathy. Mild right foraminal stenosis. At L4-L5: Minimal protrusion of the disc at the posterolateral   corners. Mild bilateral foraminal stenosis. No central canal   stenosis. Mild facet arthropathy. At L5-S1: Mild to moderate loss of disc height. Circumferential disc   osteophyte complex which is most prominent at the posterolateral   corners. Resultant moderate-to-severe bilateral foraminal stenosis   in conjunction with mild to moderate facet arthropathy. Mild central   canal stenosis by measurement. Visualized portions of the sacroiliac joints are unremarkable.    Incidentally imaged retroperitoneal structures are unremarkable as   well. IMPRESSION:  Degenerative changes as above.     Cervical spine x-rays from 2013 were personally reviewed with the Pt and demonstrated:  DISH and degenerative facets.     Written by Juan Carlos Knox, as dictated by Rene Villa MD.  I, Dr. Rene Villa confirm that all documentation is accurate.

## 2019-10-02 NOTE — LETTER
10/3/19 Patient: Anneliese Monteiro YOB: 1945 Date of Visit: 10/2/2019 Brent Lucero MD 
[address] [place] [address] VIA Facsimile: [phone] Dear Brent Lucero MD, Thank you for referring Ms. Mauro Graham to [facility] for evaluation. My notes for this consultation are attached. If you have questions, please do not hesitate to call me. I look forward to following your patient along with you. Sincerely, Umu Borja MD

## 2019-10-02 NOTE — PATIENT INSTRUCTIONS
Low Back Arthritis: Exercises Introduction Here are some examples of typical rehabilitation exercises for your condition. Start each exercise slowly. Ease off the exercise if you start to have pain. Your doctor or physical therapist will tell you when you can start these exercises and which ones will work best for you. When you are not being active, find a comfortable position for rest. Some people are comfortable on the floor or a medium-firm bed with a small pillow under their head and another under their knees. Some people prefer to lie on their side with a pillow between their knees. Don't stay in one position for too long. Take short walks (10 to 20 minutes) every 2 to 3 hours. Avoid slopes, hills, and stairs until you feel better. Walk only distances you can manage without pain, especially leg pain. How to do the exercises Pelvic tilt 1. Lie on your back with your knees bent. 2. \"Brace\" your stomachtighten your muscles by pulling in and imagining your belly button moving toward your spine. 3. Press your lower back into the floor. You should feel your hips and pelvis rock back. 4. Hold for 6 seconds while breathing smoothly. 5. Relax and allow your pelvis and hips to rock forward. 6. Repeat 8 to 12 times. Back stretches 1. Get down on your hands and knees on the floor. 2. Relax your head and allow it to droop. Round your back up toward the ceiling until you feel a nice stretch in your upper, middle, and lower back. Hold this stretch for as long as it feels comfortable, or about 15 to 30 seconds. 3. Return to the starting position with a flat back while you are on your hands and knees. 4. Let your back sway by pressing your stomach toward the floor. Lift your buttocks toward the ceiling. 5. Hold this position for 15 to 30 seconds. 6. Repeat 2 to 4 times. Follow-up care is a key part of your treatment and safety.  Be sure to make and go to all appointments, and call your doctor if you are having problems. It's also a good idea to know your test results and keep a list of the medicines you take. Where can you learn more? Go to http://ban-ovidio.info/. Enter J143 in the search box to learn more about \"Low Back Arthritis: Exercises. \" Current as of: June 26, 2019 Content Version: 12.2 © 9208-5125 [employer]. Care instructions adapted under license by [employer] (which disclaims liability or warranty for this information). If you have questions about a medical condition or this instruction, always ask your healthcare professional. [address] any warranty or liability for your use of this information. Hip Arthritis: Exercises Introduction Here are some examples of exercises for you to try. The exercises may be suggested for a condition or for rehabilitation. Start each exercise slowly. Ease off the exercises if you start to have pain. You will be told when to start these exercises and which ones will work best for you. How to do the exercises Straight-leg raises to the outside 1. Lie on your side, with your affected hip on top. 2. Tighten the front thigh muscles of your top leg to keep your knee straight. 3. Keep your hip and your leg straight in line with the rest of your body, and keep your knee pointing forward. Do not drop your hip back. 4. Lift your top leg straight up toward the ceiling, about 12 inches off the floor. Hold for about 6 seconds, then slowly lower your leg. 5. Repeat 8 to 12 times. 6. Switch legs and repeat steps 1 through 5, even if only one hip is sore. Straight-leg raises to the inside 1. Lie on your side with your affected hip on the floor. 2. You can either prop up your other leg on a chair, or you can bend that knee and put that foot in front of your other knee. Do not drop your hip back. 3. Tighten the muscles on the front thigh of your bottom leg to straighten that knee. 4. Keep your kneecap pointing forward and your leg straight, and lift your bottom leg up toward the ceiling about 6 inches. Hold for about 6 seconds, then lower slowly. 5. Repeat 8 to 12 times. 6. Switch legs and repeat steps 1 through 5, even if only one hip is sore. Hip hike 1. Stand sideways on the bottom step of a staircase, and hold on to the banister or wall. 2. Keeping both knees straight, lift your good leg off the step and let it hang down. Then hike your good hip up to the same level as your affected hip or a little higher. 3. Repeat 8 to 12 times. 4. Switch legs and repeat steps 1 through 3, even if only one hip is sore. Bridging 1. Lie on your back with both knees bent. Your knees should be bent about 90 degrees. 2. Then push your feet into the floor, squeeze your buttocks, and lift your hips off the floor until your shoulders, hips, and knees are all in a straight line. 3. Hold for about 6 seconds as you continue to breathe normally, and then slowly lower your hips back down to the floor and rest for up to 10 seconds. 4. Repeat 8 to 12 times. Hamstring stretch (lying down) 1. Lie flat on your back with your legs straight. If you feel discomfort in your back, place a small towel roll under your lower back. 2. Holding the back of your affected leg, lift your leg straight up and toward your body until you feel a stretch at the back of your thigh. 3. Hold the stretch for at least 30 seconds. 4. Repeat 2 to 4 times. 5. Switch legs and repeat steps 1 through 4, even if only one hip is sore. Standing quadriceps stretch 1. If you are not steady on your feet, hold on to a chair, counter, or wall. You can also lie on your stomach or your side to do this exercise.  
2. Bend the knee of the leg you want to stretch, and reach behind you to grab the front of your foot or ankle with the hand on the same side. For example, if you are stretching your right leg, use your right hand. 3. Keeping your knees next to each other, pull your foot toward your buttock until you feel a gentle stretch across the front of your hip and down the front of your thigh. Your knee should be pointed directly to the ground, and not out to the side. 4. Hold the stretch for at least 15 to 30 seconds. 5. Repeat 2 to 4 times. 6. Switch legs and repeat steps 1 through 5, even if only one hip is sore. Hip rotator stretch 1. Lie on your back with both knees bent and your feet flat on the floor. 2. Put the ankle of your affected leg on your opposite thigh near your knee. 3. Use your hand to gently push your knee away from your body until you feel a gentle stretch around your hip. 4. Hold the stretch for 15 to 30 seconds. 5. Repeat 2 to 4 times. 6. Repeat steps 1 through 5, but this time use your hand to gently pull your knee toward your opposite shoulder. 7. Switch legs and repeat steps 1 through 6, even if only one hip is sore. Knee-to-chest 
 
1. Lie on your back with your knees bent and your feet flat on the floor. 2. Bring your affected leg to your chest, keeping the other foot flat on the floor (or keeping the other leg straight, whichever feels better on your lower back). 3. Keep your lower back pressed to the floor. Hold for at least 15 to 30 seconds. 4. Relax, and lower the knee to the starting position. 5. Repeat 2 to 4 times. 6. Switch legs and repeat steps 1 through 5, even if only one hip is sore. 7. To get more stretch, put your other leg flat on the floor while pulling your knee to your chest. 
 
Clamshell 1. Lie on your side, with your affected hip on top. Keep your feet and knees together and your knees bent. 2. Raise your top knee, but keep your feet together. Do not let your hips roll back. Your legs should open up like a clamshell. 3. Hold for 6 seconds. 4. Slowly lower your knee back down. Rest for 10 seconds. 5. Repeat 8 to 12 times. 6. Switch legs and repeat steps 1 through 5, even if only one hip is sore. Follow-up care is a key part of your treatment and safety. Be sure to make and go to all appointments, and call your doctor if you are having problems. It's also a good idea to know your test results and keep a list of the medicines you take. Where can you learn more? Go to http://ban-ovidio.info/. Enter D834 in the search box to learn more about \"Hip Arthritis: Exercises. \" Current as of: June 26, 2019 Content Version: 12.2 © 9259-8921 [employer]. Care instructions adapted under license by [employer] (which disclaims liability or warranty for this information). If you have questions about a medical condition or this instruction, always ask your healthcare professional. [address] any warranty or liability for your use of this information.

## 2020-08-18 ENCOUNTER — TELEPHONE (OUTPATIENT)
Dept: NEUROLOGY | Age: 75
End: 2020-08-18

## 2020-08-18 NOTE — TELEPHONE ENCOUNTER
Pt is scheduled for an appt on 9/2. She is unable to obtain transportation for this appt and inquires if [facility] Bailey would be willing to do a telemed visit. Pt doesn't have access to a smart device for a virtual visit. Please advise.

## 2020-09-09 ENCOUNTER — OFFICE VISIT (OUTPATIENT)
Dept: NEUROLOGY | Age: 75
End: 2020-09-09

## 2020-09-09 VITALS
BODY MASS INDEX: 27.31 KG/M2 | WEIGHT: 148.4 LBS | OXYGEN SATURATION: 98 % | HEIGHT: 62 IN | HEART RATE: 70 BPM | RESPIRATION RATE: 18 BRPM | DIASTOLIC BLOOD PRESSURE: 82 MMHG | TEMPERATURE: 96 F | SYSTOLIC BLOOD PRESSURE: 128 MMHG

## 2020-09-09 DIAGNOSIS — G44.219 TTH (TENSION-TYPE HEADACHE), FREQUENT EPISODIC TYPE: ICD-10-CM

## 2020-09-09 DIAGNOSIS — R41.89 SUBJECTIVE MEMORY COMPLAINTS: ICD-10-CM

## 2020-09-09 DIAGNOSIS — G93.2 IIH (IDIOPATHIC INTRACRANIAL HYPERTENSION): Primary | ICD-10-CM

## 2020-09-09 NOTE — PROGRESS NOTES
Emma Blanc is a 75 y.o. female . presents for Headache   . A 75years old female patient with medical history of diabetes, hyperlipidemia, hypertension, GERD, idiopathic intracranial hypertension here for follow-up evaluation of IIH and headache. Used to follow with Dr. Angel Garcia and SOL[facility] Fraser. Currently she is taking Diamox 500 mg p.o. per day. She continues to have the headache. She feels some heaviness over the back of her head bilaterally. She also has a nagging dull aching pain on the bitemporal areas. The headaches gets worse, she tends to lay down. Tries to avoid taking pain medications. She clinically thinks too much medications and trying to cut down. The headache is almost everyday currently. Is only when she is awake and does not wake her up from sleep. She has some photophobia and phonophobia. No nausea or vomiting. She claims that she prefers to be alone and do not want to talk to other people including her family members. And also depression but no suicidal ideation. She continues to be forgetful but claims that it is more movement and her memory will come back later on. She is not currently driving. She takes her medication properly and uses a plastic pill container. No problem with finances. She is independent in all ADLs. No weakness of her extremities except for occasional locking up of her hands. She has a follow-up appointment with ophthalmology in December 2020. Review of Systems   Constitutional: Positive for chills (feels cold). Negative for fever. HENT: Positive for tinnitus. Eyes: Positive for double vision (occasionally). Negative for blurred vision (uses reading glasses). Respiratory: Negative for cough and shortness of breath. Gastrointestinal: Positive for heartburn. Negative for nausea and vomiting. Genitourinary: Negative for dysuria, frequency and urgency.    Musculoskeletal: Negative for back pain (not currently; used to have severe pain), falls (used to have falls; but not currently) and neck pain. Skin: Positive for itching (dry itch). Negative for rash. Neurological: Positive for dizziness (lightheaded), focal weakness (hands lock) and headaches. Negative for speech change. Psychiatric/Behavioral: Positive for depression.        Past Medical History:   Diagnosis Date    Arthritis     Diabetes ([address]     takes metformin    GERD (gastroesophageal reflux disease)     High cholesterol     Hypertension     Ill-defined condition     high cholesterol    Lumbar pain     Positive PPD     CXR neg       Past Surgical History:   Procedure Laterality Date    COLONOSCOPY N/A 6/19/2018    COLONOSCOPY, DIAGNOSTIC with polypectomy  performed [facility] ENDOSCOPY    HX ENDOSCOPY      HX HYSTERECTOMY      HX OVARIAN CYST REMOVAL N/A         Family History   Problem Relation Age of Onset    Cancer Mother         pancreatic    Cancer Sister         breast    Arthritis-osteo Sister     Breast Cancer Sister         Social History     Socioeconomic History    Marital status: SINGLE     Spouse name: Not on file    Number of children: Not on file    Years of education: Not on file    Highest education level: Not on file   Occupational History    Not on file   Social Needs    Financial resource strain: Not on file    Food insecurity     Worry: Not on file     Inability: Not on file    Transportation needs     Medical: Not on file     Non-medical: Not on file   Tobacco Use    Smoking status: Current Some Day Smoker     Packs/day: 0.25    Smokeless tobacco: Never Used   Substance and Sexual Activity    Alcohol use: No    Drug use: No    Sexual activity: Not on file   Lifestyle    Physical activity     Days per week: Not on file     Minutes per session: Not on file    Stress: Not on file   Relationships    Social connections     Talks on phone: Not on file     Gets together: Not on file     Attends Church service: Not on file     Active member of club or organization: Not on file     Attends meetings of clubs or organizations: Not on file     Relationship status: Not on file    Intimate partner violence     Fear of current or ex partner: Not on file     Emotionally abused: Not on file     Physically abused: Not on file     Forced sexual activity: Not on file   Other Topics Concern    Not on file   Social History Narrative    Not on file        Allergies   Allergen Reactions    Latex, Natural Rubber Itching    Lotrel [Amlodipine-Benazepril] Shortness of Breath         Current Outpatient Medications   Medication Sig Dispense Refill    tiZANidine (ZANAFLEX) 4 mg tablet Take 1 Tab by mouth two (2) times daily as needed (muscle spasm). 30 Tab 1    topiramate (TOPAMAX) 50 mg tablet Take 100 mg by mouth nightly.  HYDROcodone-acetaminophen (NORCO) 5-325 mg per tablet 1 po bid as needed for severe pain 14 Tab 0    multivitamin (ONE A DAY) tablet Take 1 Tab by mouth daily.  losartan (COZAAR) 25 mg tablet Take 25 mg by mouth daily.  gabapentin (NEURONTIN) 300 mg capsule Take 1 Cap by mouth three (3) times daily. Indications: NEUROPATHIC PAIN (Patient taking differently: Take 500 mg by mouth three (3) times daily as needed. Indications: Neuropathic Pain) 270 Cap 2    metFORMIN (GLUCOPHAGE) 1,000 mg tablet Take 1,000 mg by mouth two (2) times daily (with meals).  atorvastatin (LIPITOR) 10 mg tablet Take 10 mg by mouth daily.  aspirin delayed-release 81 mg tablet Take  by mouth daily.  amLODIPine (NORVASC) 5 mg tablet Take 5 mg by mouth daily.  acetaZOLAMIDE SR (DIAMOX) 500 mg capsule Take 500 mg by mouth daily.  traZODone (DESYREL) 50 mg tablet Take 50 mg by mouth nightly. Physical Exam  Constitutional:       Appearance: Normal appearance. HENT:      Head: Normocephalic and atraumatic. Mouth/Throat:      Mouth: Mucous membranes are moist.      Pharynx: Oropharynx is clear.  No oropharyngeal exudate. Eyes:      Extraocular Movements: Extraocular movements intact. Pupils: Pupils are equal, round, and reactive to light. Neck:      Musculoskeletal: Normal range of motion and neck supple. Pulmonary:      Effort: Pulmonary effort is normal. No respiratory distress. Musculoskeletal: Normal range of motion. Right lower leg: No edema. Left lower leg: No edema. Neurological:      Mental Status: She is alert. Comments: Mental status: Awake, alert, oriented to self, place, day/date/month/year;follows simple and complex commands, no neglect, no extinction to DSS or VSS, immediate recall 3/3 and delayed recall 3/3. Speech and languge: fluent, coherent,  and comprehension intact  CN:  VFF, EOMI, PERRLA, face sensation intact , no facial asymmetry noted, palate elevation symmetric bilat, SS+SCM 5/5 bilat, tongue midline  Motor: no pronator drift, tone normal throughout, strength 5/5 throughout  Sensory: intact to light touch and pinprick throughout  Coordination: FNF, HS accurate w/o dysmetria  DTR: 2+ throughout  Gait: Normal.            No visits with results within 3 Month(s) from this visit. Latest known visit with results is:   Admission on 06/19/2018, Discharged on 06/19/2018   Component Date Value Ref Range Status    Glucose (POC) 06/19/2018 123* 65 - 105 mg/dL Final    Comment: Notified Nurse  : [id number]               ICD-10-CM ICD-9-CM    1. IIH (idiopathic intracranial hypertension)  G93.2 348.2    2. TTH (tension-type headache), frequent episodic type  G44.219 339.11    3. Subjective memory complaints  R41.89 780.93        A 75years old female patient here for follow-up evaluation of idiopathic intracranial hypertension/headache and subjective memory problems. She continues to have daily headaches currently. Takes Diamox 500 mg p.o. per day. Not taking any pain medications. Headache description is tension type headache.   She has been on topiramate but she is not taking it currently because of side effects. I have advised her to take Tylenol as needed for severe headaches. She will continue with the Diamox. She will follow-up with ophthalmology for preoperative visual field testing. She claims she still forgets that very mild; orientation and short-term memory intact on current exam.  Patient is independent in ADL. Advised her to do more exercises including doing puzzles, reading, and interacting with people. We will see her again in 6 months time.

## 2020-09-09 NOTE — PROGRESS NOTES
Sarahy Duncan is a 75 y.o. female in office today for follow-up on headache. Referral to neuropsychology not completed as ordered. 1. Have you been to the ER, urgent care clinic since your last visit? Hospitalized since your last visit? No    2. Have you seen or consulted any other health care providers outside of the [address] [place] [address] since your last visit? Include any pap smears or colon screening.  No

## 2021-06-11 ENCOUNTER — TRANSCRIBE ORDER (OUTPATIENT)
Dept: SCHEDULING | Age: 76
End: 2021-06-11

## 2021-06-11 DIAGNOSIS — R07.9 EXERTIONAL CHEST PAIN: Primary | ICD-10-CM

## 2021-06-22 ENCOUNTER — HOSPITAL ENCOUNTER (OUTPATIENT)
Dept: NUCLEAR MEDICINE | Age: 76
Discharge: HOME | End: 2021-06-22
Attending: INTERNAL MEDICINE
Payer: MEDICARE

## 2021-06-22 ENCOUNTER — HOSPITAL ENCOUNTER (OUTPATIENT)
Dept: NON INVASIVE DIAGNOSTICS | Age: 76
Discharge: HOME | End: 2021-06-22
Attending: INTERNAL MEDICINE
Payer: MEDICARE

## 2021-06-22 VITALS
BODY MASS INDEX: 25.76 KG/M2 | SYSTOLIC BLOOD PRESSURE: 165 MMHG | WEIGHT: 140 LBS | DIASTOLIC BLOOD PRESSURE: 80 MMHG | HEIGHT: 62 IN

## 2021-06-22 DIAGNOSIS — R07.9 EXERTIONAL CHEST PAIN: ICD-10-CM

## 2021-06-22 LAB
STRESS BASELINE DIAS BP: 80 MMHG
STRESS BASELINE HR: 62 BPM
STRESS BASELINE SYS BP: 165 MMHG
STRESS ESTIMATED WORKLOAD: 1 METS
STRESS EXERCISE DUR MIN: NORMAL
STRESS PEAK DIAS BP: 87 MMHG
STRESS PEAK SYS BP: 171 MMHG
STRESS PERCENT HR ACHIEVED: 72 %
STRESS POST PEAK HR: 103 BPM
STRESS RATE PRESSURE PRODUCT: NORMAL BPM*MMHG
STRESS ST DEPRESSION: 0 MM
STRESS ST ELEVATION: 0 MM
STRESS TARGET HR: 144 BPM

## 2021-06-22 PROCEDURE — 74011250636 HC RX REV CODE- 250/636

## 2021-06-22 PROCEDURE — 93017 CV STRESS TEST TRACING ONLY: CPT

## 2021-06-22 RX ORDER — TETRAKIS(2-METHOXYISOBUTYLISOCYANIDE)COPPER(I) TETRAFLUOROBORATE 1 MG/ML
10.9 INJECTION, POWDER, LYOPHILIZED, FOR SOLUTION INTRAVENOUS
Status: COMPLETED | OUTPATIENT
Start: 2021-06-22 | End: 2021-06-22

## 2021-06-22 RX ORDER — TETRAKIS(2-METHOXYISOBUTYLISOCYANIDE)COPPER(I) TETRAFLUOROBORATE 1 MG/ML
33 INJECTION, POWDER, LYOPHILIZED, FOR SOLUTION INTRAVENOUS
Status: COMPLETED | OUTPATIENT
Start: 2021-06-22 | End: 2021-06-22

## 2021-06-22 RX ORDER — SODIUM CHLORIDE 9 MG/ML
250 INJECTION, SOLUTION INTRAVENOUS ONCE
Status: COMPLETED | OUTPATIENT
Start: 2021-06-22 | End: 2021-06-22

## 2021-06-22 RX ADMIN — TETRAKIS(2-METHOXYISOBUTYLISOCYANIDE)COPPER(I) TETRAFLUOROBORATE 33 MILLICURIE: 1 INJECTION, POWDER, LYOPHILIZED, FOR SOLUTION INTRAVENOUS at 13:00

## 2021-06-22 RX ADMIN — TETRAKIS(2-METHOXYISOBUTYLISOCYANIDE)COPPER(I) TETRAFLUOROBORATE 10.9 MILLICURIE: 1 INJECTION, POWDER, LYOPHILIZED, FOR SOLUTION INTRAVENOUS at 13:00

## 2021-06-22 RX ADMIN — SODIUM CHLORIDE 250 ML/HR: 900 INJECTION, SOLUTION INTRAVENOUS at 12:59

## 2021-06-22 RX ADMIN — REGADENOSON 0.4 MG: 0.08 INJECTION, SOLUTION INTRAVENOUS at 12:56

## 2021-06-22 NOTE — PROGRESS NOTES
.Patient given 10.9 mCi of 99m Tc sestamibi for the resting image. Patient was also given 33 mCi of 99m Tc sestamibi for the stress image.     Patient was given 0.4mg of Lexiscan during stress portion of test.

## 2021-08-18 ENCOUNTER — VIRTUAL VISIT (OUTPATIENT)
Dept: ORTHOPEDIC SURGERY | Age: 76
End: 2021-08-18
Payer: MEDICARE

## 2021-08-18 DIAGNOSIS — R26.9 GAIT ABNORMALITY: ICD-10-CM

## 2021-08-18 DIAGNOSIS — M47.816 LUMBAR FACET ARTHROPATHY: Primary | ICD-10-CM

## 2021-08-18 DIAGNOSIS — M62.838 MUSCLE SPASM: ICD-10-CM

## 2021-08-18 DIAGNOSIS — M48.061 SPINAL STENOSIS OF LUMBAR REGION WITHOUT NEUROGENIC CLAUDICATION: ICD-10-CM

## 2021-08-18 PROCEDURE — 99442 PR PHYS/QHP TELEPHONE EVALUATION 11-20 MIN: CPT | Performed by: PHYSICAL MEDICINE & REHABILITATION

## 2021-08-18 NOTE — PROGRESS NOTES
[facility]  [address] [place], [address]  Phone: [phone]  Fax: [phone]        TELEPHONE VISIT    Pt's YOB: 1945    ASSESSMENT   Diagnoses and all orders for this visit:    1. Lumbar facet arthropathy    2. Muscle spasm    3. Spinal stenosis of lumbar region without neurogenic claudication    4. Gait abnormality         IMPRESSION AND PLAN:  Getachew Costa is a 67 y.o.  female with history of lumbar painAt this time she does not want any medication and will continue with work up for her knee pain    1) Pt will continue with knee pain evaluation  2) Pt will continue with girdle support which helps with lumbar pain  3) Consider further work up with x-ray and advanced imaging studies pending response to current treatment  4) Ms. Dinh Cohen has a reminder for a \"due or due soon\" health maintenance. I have asked that she contact her primary care provider, Meir Zamudio MD, for follow-up on this health maintenance. 5)  demonstrated consistency with prescribing. Follow-up and Dispositions    · Return if symptoms worsen or fail to improve. CPT Codes 42605-09894 for Established Patients may apply to this TeleHealth Visit. Due to this being a TeleHealth evaluation, many elements of the physical examination are unable to be assessed. Pursuant to the emergency declaration under the [facility]5 waiver authority and the [employer] [place] Act, this Virtual Visit was conducted, with patient's consent, to reduce the patient's risk of exposure to COVID-19 and provide continuity of care for an established patient. Services were provided through a telephone discussion virtually to substitute for in-person clinic visit. 11 min and 8 sec were spent with the pt discussing her symptoms, medications and treatment.      HISTORY OF PRESENT ILLNESS:  Octavia Rojo is a 67 y.o.  female with history of lumbar pain and is evaluated telephone call at the [facility] location on 8/18/2021 with her verbal consent for  follow up. Pt was last seen in Oct 2019. She states she experiences acute severe pain at times which causes her back to lock up for several days and makes it difficult to function. She has been using a panty girdle which has been considerably helpful. She also has been evaluated through [facility] for knee pain. She states her pain previously had been on the right but is now on the left. She reports pain in her thigh and on her left side. At this time she does not want any medication and will continue with work up for her knee pain    Pain Scale: /10    PCP: Gisselle Marino MD     Past Medical History:   Diagnosis Date    Arthritis     Diabetes ([address]     takes metformin    GERD (gastroesophageal reflux disease)     High cholesterol     Hypertension     Ill-defined condition     high cholesterol    Lumbar pain     Positive PPD     CXR neg        Social History     Socioeconomic History    Marital status: SINGLE     Spouse name: Not on file    Number of children: Not on file    Years of education: Not on file    Highest education level: Not on file   Occupational History    Not on file   Tobacco Use    Smoking status: Current Some Day Smoker     Packs/day: 0.25    Smokeless tobacco: Never Used   Substance and Sexual Activity    Alcohol use: No    Drug use: No    Sexual activity: Not on file   Other Topics Concern    Not on file   Social History Narrative    Not on file     Social Determinants of Health     Financial Resource Strain:     Difficulty of Paying Living Expenses:    Food Insecurity:     Worried About Running Out of Food in the Last Year:     Stephanie of Food in the Last Year:    Transportation Needs:     Lack of Transportation (Medical):      Lack of Transportation (Non-Medical):    Physical Activity:     Days of Exercise per Week:     Minutes of Exercise per Session:    Stress:     Feeling of Stress :    Social Connections:     Frequency of Communication with Friends and Family:     Frequency of Social Gatherings with Friends and Family:     Attends Church Services:     Active Member of Clubs or Organizations:     Attends Club or Organization Meetings:     Marital Status:    Intimate Partner Violence:     Fear of Current or Ex-Partner:     Emotionally Abused:     Physically Abused:     Sexually Abused:        Current Outpatient Medications   Medication Sig Dispense Refill    tiZANidine (ZANAFLEX) 4 mg tablet Take 1 Tab by mouth two (2) times daily as needed (muscle spasm). 30 Tab 1    HYDROcodone-acetaminophen (NORCO) 5-325 mg per tablet 1 po bid as needed for severe pain 14 Tab 0    multivitamin (ONE A DAY) tablet Take 1 Tab by mouth daily.  losartan (COZAAR) 25 mg tablet Take 25 mg by mouth daily.  gabapentin (NEURONTIN) 300 mg capsule Take 1 Cap by mouth three (3) times daily. Indications: NEUROPATHIC PAIN (Patient taking differently: Take 500 mg by mouth three (3) times daily as needed. Indications: Neuropathic Pain) 270 Cap 2    metFORMIN (GLUCOPHAGE) 1,000 mg tablet Take 1,000 mg by mouth two (2) times daily (with meals).  atorvastatin (LIPITOR) 10 mg tablet Take 10 mg by mouth daily.  aspirin delayed-release 81 mg tablet Take  by mouth daily.  amLODIPine (NORVASC) 5 mg tablet Take 5 mg by mouth daily.  acetaZOLAMIDE SR (DIAMOX) 500 mg capsule Take 500 mg by mouth daily.  traZODone (DESYREL) 50 mg tablet Take 50 mg by mouth nightly. Allergies   Allergen Reactions    Latex, Natural Rubber Itching    Lotrel [Amlodipine-Benazepril] Shortness of Breath         REVIEW OF SYSTEMS    Constitutional: Negative for fever, chills, or weight change. Respiratory: Negative for cough or shortness of breath.      Cardiovascular: Negative for chest pain or palpitations. Gastrointestinal: Negative for acid reflux, change in bowel habits, or constipation. Genitourinary: Negative for dysuria and flank pain. Musculoskeletal: Positive for lumbar pain. Neurological: Negative for headaches, dizziness, or numbness  Endo/Heme/Allergies: Negative for increased bruising. Psychiatric/Behavioral: Negative for difficulty with sleep. IMAGING:    Lumbar spine x-rays from 01/02/2019 were personally reviewed with the Pt and demonstrated:  Severe degenerative disc at L5-S1 with listhesis at L4-5. Mild atherosclerosis and severe multilevel degenerative facets.     Hip x-rays from 01/02/2019 were personally reviewed with the Pt and demonstrated:  Severe degenerative joint findings bilaterally.     Lumbar spine MRI from 02/29/2012 was personally reviewed with the Pt and demonstrated:  Findings:  Sagittal images reveal overall normal vertebral body morphology. No   fractures noted. No suspicious lesions.       Alignments are anatomic, no evidence for subluxation.    Conus medullaris ends at the L1  vertebral body level. Correlation of axial and sagittal images reveals the following:  At L1-L2: No significant disc pathology. No significant facet   arthropathy. No central canal or foraminal stenosis. At L2-L3: No significant disc pathology. No significant facet   arthropathy. No central canal or foraminal stenosis. At L3-L4: Mild broad-based disc protrusion. No central canal   stenosis. Mild facet arthropathy. Mild right foraminal stenosis. At L4-L5: Minimal protrusion of the disc at the posterolateral   corners. Mild bilateral foraminal stenosis. No central canal   stenosis. Mild facet arthropathy. At L5-S1: Mild to moderate loss of disc height. Circumferential disc   osteophyte complex which is most prominent at the posterolateral   corners. Resultant moderate-to-severe bilateral foraminal stenosis   in conjunction with mild to moderate facet arthropathy.  Mild central canal stenosis by measurement. Visualized portions of the sacroiliac joints are unremarkable.    Incidentally imaged retroperitoneal structures are unremarkable as   well.   IMPRESSION:  Degenerative changes as above.       Cervical spine x-rays from 2013 were personally reviewed with the Pt and demonstrated:  DISH and degenerative facets.

## 2022-03-19 PROBLEM — Z51.81 THERAPEUTIC DRUG MONITORING: Status: ACTIVE | Noted: 2017-04-26

## 2022-05-11 ENCOUNTER — OFFICE VISIT (OUTPATIENT)
Dept: ORTHOPEDIC SURGERY | Age: 77
End: 2022-05-11
Payer: MEDICARE

## 2022-05-11 VITALS
OXYGEN SATURATION: 98 % | HEIGHT: 62 IN | BODY MASS INDEX: 28.52 KG/M2 | WEIGHT: 155 LBS | HEART RATE: 84 BPM | SYSTOLIC BLOOD PRESSURE: 134 MMHG | DIASTOLIC BLOOD PRESSURE: 77 MMHG | RESPIRATION RATE: 16 BRPM | TEMPERATURE: 95.8 F

## 2022-05-11 DIAGNOSIS — M25.551 BILATERAL HIP PAIN: Primary | ICD-10-CM

## 2022-05-11 DIAGNOSIS — M48.061 SPINAL STENOSIS OF LUMBAR REGION WITHOUT NEUROGENIC CLAUDICATION: ICD-10-CM

## 2022-05-11 DIAGNOSIS — M16.12 OSTEOARTHRITIS OF LEFT HIP, UNSPECIFIED OSTEOARTHRITIS TYPE: ICD-10-CM

## 2022-05-11 DIAGNOSIS — M25.552 BILATERAL HIP PAIN: ICD-10-CM

## 2022-05-11 DIAGNOSIS — M25.551 BILATERAL HIP PAIN: ICD-10-CM

## 2022-05-11 DIAGNOSIS — M47.816 LUMBAR FACET ARTHROPATHY: ICD-10-CM

## 2022-05-11 DIAGNOSIS — M62.838 MUSCLE SPASM: ICD-10-CM

## 2022-05-11 DIAGNOSIS — M25.552 BILATERAL HIP PAIN: Primary | ICD-10-CM

## 2022-05-11 PROCEDURE — 1090F PRES/ABSN URINE INCON ASSESS: CPT | Performed by: PHYSICAL MEDICINE & REHABILITATION

## 2022-05-11 PROCEDURE — 99214 OFFICE O/P EST MOD 30 MIN: CPT | Performed by: PHYSICAL MEDICINE & REHABILITATION

## 2022-05-11 PROCEDURE — G8399 PT W/DXA RESULTS DOCUMENT: HCPCS | Performed by: PHYSICAL MEDICINE & REHABILITATION

## 2022-05-11 PROCEDURE — G8432 DEP SCR NOT DOC, RNG: HCPCS | Performed by: PHYSICAL MEDICINE & REHABILITATION

## 2022-05-11 PROCEDURE — G8427 DOCREV CUR MEDS BY ELIG CLIN: HCPCS | Performed by: PHYSICAL MEDICINE & REHABILITATION

## 2022-05-11 PROCEDURE — G8419 CALC BMI OUT NRM PARAM NOF/U: HCPCS | Performed by: PHYSICAL MEDICINE & REHABILITATION

## 2022-05-11 PROCEDURE — G8536 NO DOC ELDER MAL SCRN: HCPCS | Performed by: PHYSICAL MEDICINE & REHABILITATION

## 2022-05-11 PROCEDURE — 1101F PT FALLS ASSESS-DOCD LE1/YR: CPT | Performed by: PHYSICAL MEDICINE & REHABILITATION

## 2022-05-11 RX ORDER — PREDNISONE 10 MG/1
TABLET ORAL
Qty: 11 TABLET | Refills: 0 | Status: SHIPPED | OUTPATIENT
Start: 2022-05-11

## 2022-05-11 NOTE — PROGRESS NOTES
[facility]  [address] [place], [address]  Phone: [phone]  Fax: [phone]    Pt's YOB: 1945    ASSESSMENT   Diagnoses and all orders for this visit:    1. Bilateral hip pain  -     XR HIP RT W OR WO PELV 2-3 VWS; Future  -     XR HIP LT W OR WO PELV 2-3 VWS; Future  -     predniSONE (DELTASONE) 10 mg tablet; 2 pills in am for 3 days, then 1 pill in am for 3 days and 1/2 pill in am for remainder    2. Osteoarthritis of left hip, unspecified osteoarthritis type  -     XR HIP LT W OR WO PELV 2-3 VWS; Future    3. Lumbar facet arthropathy  -     XR SPINE LUMB MIN 4 V; Future  -     MRI LUMB SPINE WO CONT; Future  -     predniSONE (DELTASONE) 10 mg tablet; 2 pills in am for 3 days, then 1 pill in am for 3 days and 1/2 pill in am for remainder    4. Muscle spasm  -     MRI LUMB SPINE WO CONT; Future    5. Spinal stenosis of lumbar region without neurogenic claudication  -     MRI LUMB SPINE WO CONT; Future         IMPRESSION AND PLAN:  Tia Sun is a 67 y.o. female with history of lumbar pain. She complains of pain in the right anterior and medial groin with ambulation, right hip and thigh pain and feelings of instability in the right hip with ambulation x a few months, intermittent feelings of skin tightening and numbness in the bilateral legs, and continued bilateral lower extremity pain with ambulation. Pt takes a daily multivitamin and supplements with biotin daily and vitamin D intermittently. 1) Pt was given information on lumbar arthritis exercises. 2) Lumbar spine and bilateral hip x-rays were ordered at an outside facility (no x-ray available in the office today). 3) A lumbar MRI was ordered to assess for spinal stenosis as a cause of pt's bilateral lower extremity pain and numbness. 4) Pt was prescribed a small prednisone taper for acute inflammatory pain.   5) Pt was advised to discuss vitamin D3 and zinc supplementation with her PCP. 6) Ms. Carolynn Snellen has a reminder for a \"due or due soon\" health maintenance. I have asked that she contact her primary care provider, Breana Figueroa MD, for follow-up on this health maintenance. 7)  demonstrated consistency with prescribing. Follow-up and Dispositions    · Return in about 4 weeks (around 6/8/2022) for Diagnostic Test follow up, Medication follow up. HISTORY OF PRESENT ILLNESS:  Billy Portillo is a 67 y.o. female with history of lumbar pain and presents to the office today for medication follow up. Pt complains of pain in the right anterior and medial groin with ambulation, right hip and thigh pain and feelings of instability in the right hip with ambulation x a few months, intermittent feelings of skin tightening and numbness in the bilateral legs, and continued bilateral lower extremity pain with ambulation. She reports an episode of severe cramping pain radiating from the right hip down the right lower extremity to the knee x 1 hour, treated with heat, exercise, and TheraWorx, on the night of 05/10/2022. However, pt states that her lumbar pain and spasms have not recurred recently. She notes bilateral knee pain and states she uses OTC topical treatments on her knees. Pt also admits to dizziness with standing after bending. She states she has had previous  physical therapy. Pt takes a daily multivitamin and supplements with biotin. She states that she was previously prescribed vitamin D by her PCP but found the pills too large to swallow; she states that she then purchased vitamin D and mineral gummies but does not take them regularly. Pt admits to having type II diabetes mellitus and notes a period in which her blood glucose was in the 200s mg/dL. She further states that her blood glucose is better controlled now and ranges between 140-160 mg/dL. She denies claustrophobia or any prior lumbar surgeries.  Pt at this time desires to proceed with lumbar and bilateral hip x-rays and a lumbar MRI. Of note, pt presents to today's office visit with her shoes removed. She states that she relies on medical transport to get to her medical appointments. Pain Scale: 7/10    PCP: Dudley Espinoza MD     Past Medical History:   Diagnosis Date    Arthritis     Diabetes ([address]     takes metformin    GERD (gastroesophageal reflux disease)     High cholesterol     Hypertension     Ill-defined condition     high cholesterol    Lumbar pain     Positive PPD     CXR neg        Social History     Socioeconomic History    Marital status: SINGLE     Spouse name: Not on file    Number of children: Not on file    Years of education: Not on file    Highest education level: Not on file   Occupational History    Not on file   Tobacco Use    Smoking status: Current Some Day Smoker     Packs/day: 0.25    Smokeless tobacco: Never Used   Substance and Sexual Activity    Alcohol use: No    Drug use: No    Sexual activity: Not on file   Other Topics Concern    Not on file   Social History Narrative    Not on file     Social Determinants of Health     Financial Resource Strain:     Difficulty of Paying Living Expenses: Not on file   Food Insecurity:     Worried About Running Out of Food in the Last Year: Not on file    Stephanie of Food in the Last Year: Not on file   Transportation Needs:     Lack of Transportation (Medical): Not on file    Lack of Transportation (Non-Medical):  Not on file   Physical Activity:     Days of Exercise per Week: Not on file    Minutes of Exercise per Session: Not on file   Stress:     Feeling of Stress : Not on file   Social Connections:     Frequency of Communication with Friends and Family: Not on file    Frequency of Social Gatherings with Friends and Family: Not on file    Attends [employer] Services: Not on file    Active Member of Clubs or Organizations: Not on file    Attends Club or Organization Meetings: Not on file  Marital Status: Not on file   Intimate Partner Violence:     Fear of Current or Ex-Partner: Not on file    Emotionally Abused: Not on file    Physically Abused: Not on file    Sexually Abused: Not on file   Housing Stability:     Unable to Pay for Housing in the Last Year: Not on file    Number of [place] in the Last Year: Not on file    Unstable Housing in the Last Year: Not on file       Current Outpatient Medications   Medication Sig Dispense Refill    loratadine 10 mg cap Take  by mouth.  predniSONE (DELTASONE) 10 mg tablet 2 pills in am for 3 days, then 1 pill in am for 3 days and 1/2 pill in am for remainder 11 Tablet 0    tiZANidine (ZANAFLEX) 4 mg tablet Take 1 Tab by mouth two (2) times daily as needed (muscle spasm). 30 Tab 1    multivitamin (ONE A DAY) tablet Take 1 Tab by mouth daily.  losartan (COZAAR) 25 mg tablet Take 25 mg by mouth daily.  gabapentin (NEURONTIN) 300 mg capsule Take 1 Cap by mouth three (3) times daily. Indications: NEUROPATHIC PAIN (Patient taking differently: Take 500 mg by mouth three (3) times daily as needed. Indications: Neuropathic Pain) 270 Cap 2    metFORMIN (GLUCOPHAGE) 1,000 mg tablet Take 1,000 mg by mouth two (2) times daily (with meals).  atorvastatin (LIPITOR) 10 mg tablet Take 10 mg by mouth daily.  amLODIPine (NORVASC) 5 mg tablet Take 5 mg by mouth daily.  acetaZOLAMIDE SR (DIAMOX) 500 mg capsule Take 500 mg by mouth daily.  HYDROcodone-acetaminophen (NORCO) 5-325 mg per tablet 1 po bid as needed for severe pain (Patient not taking: Reported on 5/11/2022) 14 Tab 0    aspirin delayed-release 81 mg tablet Take  by mouth daily. (Patient not taking: Reported on 5/11/2022)      traZODone (DESYREL) 50 mg tablet Take 50 mg by mouth nightly.  (Patient not taking: Reported on 5/11/2022)         Allergies   Allergen Reactions    Latex, Natural Rubber Itching    Lotrel [Amlodipine-Benazepril] Shortness of Breath REVIEW OF SYSTEMS    Constitutional: Negative for fever, chills, or weight change. Respiratory: Negative for cough or shortness of breath. Cardiovascular: Negative for chest pain or palpitations. Gastrointestinal: Negative for acid reflux, change in bowel habits, or constipation. Genitourinary: Negative for dysuria and flank pain. Musculoskeletal: Positive for right hip and anterior and medial groin and bilateral thigh, knee, and leg pain. Skin: Negative for rash. Neurological: Negative for headaches. Positive for positional dizziness. Positive for bilateral leg numbness. Endo/Heme/Allergies: Negative for increased bruising. Psychiatric/Behavioral: Negative for difficulty with sleep. As per HPI    PHYSICAL EXAMINATION  Visit Vitals  /77 (BP 1 Location: Right arm, BP Patient Position: Sitting, BP Cuff Size: Adult)   Pulse 84   Temp (!) 95.8 °F (35.4 °C) (Tympanic)   Resp 16   Ht 5' 2\" (1.575 m)   Wt 155 lb (70.3 kg)   SpO2 98%   BMI 28.35 kg/m²       Constitutional: Awake, alert, and in no acute distress. Neurological: Sensation to light touch is intact. Skin: warm, dry, and intact. Musculoskeletal: Tenderness to palpation over the upper trapezius. Tenderness to palpation over the lower lumbar region. No pain with extension, axial loading, or forward flexion. Pain in the bilateral anterior groin with internal or external rotation of the ipsilateral hip, R>L. Negative straight leg raise bilaterally. Hip Flex  Quads Hamstrings Ankle DF EHL Ankle PF   Right +4/5 +4/5 +4/5 +4/5 +4/5 +4/5   Left +4/5 +4/5 +4/5 +4/5 +4/5 +4/5     IMAGING:    Lumbar spine MRI from 02/29/2012 was personally reviewed with the Pt and demonstrated:  Findings:  Sagittal images reveal overall normal vertebral body morphology. No   fractures noted. No suspicious lesions.       Alignments are anatomic, no evidence for subluxation.    Conus medullaris ends at the L1  vertebral body level.   Correlation of axial and sagittal images reveals the following:  At L1-L2: No significant disc pathology. No significant facet   arthropathy. No central canal or foraminal stenosis. At L2-L3: No significant disc pathology. No significant facet   arthropathy. No central canal or foraminal stenosis. At L3-L4: Mild broad-based disc protrusion. No central canal   stenosis. Mild facet arthropathy. Mild right foraminal stenosis. At L4-L5: Minimal protrusion of the disc at the posterolateral   corners. Mild bilateral foraminal stenosis. No central canal   stenosis. Mild facet arthropathy. At L5-S1: Mild to moderate loss of disc height. Circumferential disc   osteophyte complex which is most prominent at the posterolateral   corners. Resultant moderate-to-severe bilateral foraminal stenosis   in conjunction with mild to moderate facet arthropathy. Mild central   canal stenosis by measurement. Visualized portions of the sacroiliac joints are unremarkable.    Incidentally imaged retroperitoneal structures are unremarkable as   well. IMPRESSION:  Degenerative changes as above. Lumbar spine x-rays from 01/02/2019 were personally reviewed with the Pt and demonstrated:  Severe degenerative disc at L5-S1 with listhesis at L4-5. Mild atherosclerosis and severe multilevel degenerative facets.     Hip x-rays from 01/02/2019 were personally reviewed with the Pt and demonstrated:  Severe degenerative joint findings bilaterally.      Cervical spine x-rays from 2013 were personally reviewed with the Pt and demonstrated:  DISH and degenerative facets.     Written by Celso Dandy, as dictated by Roel Kim MD.  I, Dr. Roel Kim confirm that all documentation is accurate.

## 2022-05-11 NOTE — PROGRESS NOTES
Chief Complaint   Patient presents with    Follow-up       Pt preferred language for health care discussion is english. Is someone accompanying this pt? no    Is the patient using any DME equipment during [address]? no    Depression Screening:  3 most recent PHQ Screens 10/2/2019 8/21/2019 5/21/2019 12/5/2018   Little interest or pleasure in doing things Not at all Not at all Not at all Not at all   Feeling down, depressed, irritable, or hopeless Not at all Several days Several days Not at all   Total Score PHQ 2 0 1 1 0       Learning Assessment:  No flowsheet data found. Abuse Screening:  No flowsheet data found. Fall Risk  Fall Risk Assessment, last 12 mths 10/2/2019   Able to walk? Yes   Fall in past 12 months? No   Number of falls in past 12 months -   Fall with injury? -           Advance Directive:  1. Do you have an advance directive in place? Patient Reply:no    2. If not, would you like material regarding how to put one in place? Patient Reply: no    2. Per patient no changes to their ACP contact no. Coordination of Care:  1. Have you been to the ER, urgent care clinic since your last visit? Hospitalized since your last visit? no    2. Have you seen or consulted any other health care providers outside of the [address] [place] [address] since your last visit? Include any pap smears or colon screening.  no

## 2022-05-26 DIAGNOSIS — M25.552 BILATERAL HIP PAIN: ICD-10-CM

## 2022-05-26 DIAGNOSIS — M47.816 LUMBAR FACET ARTHROPATHY: ICD-10-CM

## 2022-05-26 DIAGNOSIS — M25.551 BILATERAL HIP PAIN: ICD-10-CM

## 2022-05-26 RX ORDER — PREDNISONE 10 MG/1
TABLET ORAL
Qty: 11 TABLET | Refills: 0 | OUTPATIENT
Start: 2022-05-26

## 2022-05-26 NOTE — TELEPHONE ENCOUNTER
Last Visit: 22 with MD [facility]  [facility]: [facility]  Previous Refill Encounter(s): 22 #11    Requested Prescriptions     Pending Prescriptions Disp Refills    predniSONE (DELTASONE) 10 mg tablet 11 Tablet 0     Si pills in am for 3 days, then 1 pill in am for 3 days and 1/2 pill in am for remainder

## 2022-05-27 ENCOUNTER — HOSPITAL ENCOUNTER (OUTPATIENT)
Dept: GENERAL RADIOLOGY | Age: 77
Discharge: HOME | End: 2022-05-27
Payer: MEDICARE

## 2022-05-27 DIAGNOSIS — M25.552 BILATERAL HIP PAIN: ICD-10-CM

## 2022-05-27 DIAGNOSIS — M16.12 OSTEOARTHRITIS OF LEFT HIP, UNSPECIFIED OSTEOARTHRITIS TYPE: ICD-10-CM

## 2022-05-27 DIAGNOSIS — M25.551 BILATERAL HIP PAIN: ICD-10-CM

## 2022-05-27 PROCEDURE — 72110 X-RAY EXAM L-2 SPINE 4/>VWS: CPT

## 2022-05-27 PROCEDURE — 73521 X-RAY EXAM HIPS BI 2 VIEWS: CPT

## 2022-05-29 ENCOUNTER — HOSPITAL ENCOUNTER (OUTPATIENT)
Age: 77
Discharge: HOME | End: 2022-05-29
Attending: PHYSICAL MEDICINE & REHABILITATION
Payer: MEDICARE

## 2022-05-29 PROCEDURE — 72148 MRI LUMBAR SPINE W/O DYE: CPT

## 2022-06-28 ENCOUNTER — OFFICE VISIT (OUTPATIENT)
Dept: ORTHOPEDIC SURGERY | Age: 77
End: 2022-06-28
Payer: MEDICARE

## 2022-06-28 VITALS
HEART RATE: 80 BPM | BODY MASS INDEX: 27.97 KG/M2 | TEMPERATURE: 97 F | DIASTOLIC BLOOD PRESSURE: 60 MMHG | OXYGEN SATURATION: 97 % | SYSTOLIC BLOOD PRESSURE: 111 MMHG | WEIGHT: 152 LBS | HEIGHT: 62 IN | RESPIRATION RATE: 16 BRPM

## 2022-06-28 DIAGNOSIS — G62.9 NEUROPATHY: Primary | ICD-10-CM

## 2022-06-28 DIAGNOSIS — M48.061 SPINAL STENOSIS OF LUMBAR REGION WITHOUT NEUROGENIC CLAUDICATION: ICD-10-CM

## 2022-06-28 DIAGNOSIS — M47.816 LUMBAR FACET ARTHROPATHY: ICD-10-CM

## 2022-06-28 DIAGNOSIS — M16.0 OSTEOARTHRITIS OF BOTH HIPS, UNSPECIFIED OSTEOARTHRITIS TYPE: ICD-10-CM

## 2022-06-28 DIAGNOSIS — M62.838 MUSCLE SPASM: ICD-10-CM

## 2022-06-28 DIAGNOSIS — R26.9 GAIT ABNORMALITY: ICD-10-CM

## 2022-06-28 PROCEDURE — 1090F PRES/ABSN URINE INCON ASSESS: CPT | Performed by: PHYSICAL MEDICINE & REHABILITATION

## 2022-06-28 PROCEDURE — G8417 CALC BMI ABV UP PARAM F/U: HCPCS | Performed by: PHYSICAL MEDICINE & REHABILITATION

## 2022-06-28 PROCEDURE — 1123F ACP DISCUSS/DSCN MKR DOCD: CPT | Performed by: PHYSICAL MEDICINE & REHABILITATION

## 2022-06-28 PROCEDURE — 99214 OFFICE O/P EST MOD 30 MIN: CPT | Performed by: PHYSICAL MEDICINE & REHABILITATION

## 2022-06-28 PROCEDURE — G8536 NO DOC ELDER MAL SCRN: HCPCS | Performed by: PHYSICAL MEDICINE & REHABILITATION

## 2022-06-28 PROCEDURE — G8427 DOCREV CUR MEDS BY ELIG CLIN: HCPCS | Performed by: PHYSICAL MEDICINE & REHABILITATION

## 2022-06-28 PROCEDURE — G8399 PT W/DXA RESULTS DOCUMENT: HCPCS | Performed by: PHYSICAL MEDICINE & REHABILITATION

## 2022-06-28 PROCEDURE — 1101F PT FALLS ASSESS-DOCD LE1/YR: CPT | Performed by: PHYSICAL MEDICINE & REHABILITATION

## 2022-06-28 PROCEDURE — G8432 DEP SCR NOT DOC, RNG: HCPCS | Performed by: PHYSICAL MEDICINE & REHABILITATION

## 2022-06-28 RX ORDER — ACETAMINOPHEN AND CODEINE PHOSPHATE 300; 30 MG/1; MG/1
1 TABLET ORAL
Qty: 28 TABLET | Refills: 0 | Status: SHIPPED | OUTPATIENT
Start: 2022-06-28 | End: 2022-07-05

## 2022-06-28 NOTE — PROGRESS NOTES
Joshua Rojaslauri presents today for   Chief Complaint   Patient presents with    Back Pain       Is someone accompanying this pt? no    Is the patient using any DME equipment during OV? no    Depression Screening:  3 most recent PHQ Screens 10/2/2019   Little interest or pleasure in doing things Not at all   Feeling down, depressed, irritable, or hopeless Not at all   Total Score PHQ 2 0       Learning Assessment:  No flowsheet data found. Abuse Screening:  No flowsheet data found. Fall Risk  Fall Risk Assessment, last 12 mths 10/2/2019   Able to walk? Yes   Fall in past 12 months? No   Number of falls in past 12 months -   Fall with injury? -       OPIOID RISK TOOL  No flowsheet data found. Coordination of Care:  1. Have you been to the ER, urgent care clinic since your last visit? no  Hospitalized since your last visit? no    2. Have you seen or consulted any other health care providers outside of the [address] [place] [address] since your last visit? no Include any pap smears or colon screening.  no

## 2022-06-28 NOTE — PROGRESS NOTES
[facility]  [address], [facility] [address]  Phone: [phone]  Fax: [phone]    Pt's YOB: 1945    ASSESSMENT   Diagnoses and all orders for this visit:    1. Neuropathy    2. Lumbar facet arthropathy  -     acetaminophen-codeine (Tylenol-Codeine #3) 300-30 mg per tablet; Take 1 Tablet by mouth every six (6) hours as needed for Pain for up to 7 days. Max Daily Amount: 4 Tablets. 3. Muscle spasm    4. Osteoarthritis of both hips, unspecified osteoarthritis type  -     acetaminophen-codeine (Tylenol-Codeine #3) 300-30 mg per tablet; Take 1 Tablet by mouth every six (6) hours as needed for Pain for up to 7 days. Max Daily Amount: 4 Tablets. 5. Spinal stenosis of lumbar region without neurogenic claudication    6. Gait abnormality         IMPRESSION AND PLAN:  Billy Portillo is a 67 y.o. female with history of lumbar pain. Pt complains of leg/foot numbness/coldness and notes her feet do not feel cold to touch. She notes intense burning pain in the lower back at night. Pt is taking gabapentin 100 mg QHS with minimal relief. 1) Pt was given information on lumbar arthritis exercises. 2) She will increase gabapentin 100 mg to 2 caps QHSt for 1-2 weeks. She will then increase to 1 QAM and 2 QHS. 3) Pt is prescribed Tylenol #3 prn pain to help with rest to assess for neuropathy   4) Recommend repeat EMG LE with Dr. Brenden Garrett if her sxs do not improve. 5) Ms. Carolynn Snellen has a reminder for a \"due or due soon\" health maintenance. I have asked that she contact her primary care provider, Jessie Shah MD, for follow-up on this health maintenance. 6)  demonstrated consistency with prescribing. Follow-up and Dispositions    · Return in about 4 weeks (around 7/26/2022) for Medication follow up.              HISTORY OF PRESENT ILLNESS:  Billy Portillo is a 67 y.o. female with history of lumbar pain and presents to the office today for MRI follow up. Pt complains of leg/foot numbness/coldness and notes her feet do not feel cold to touch. Pt notes that she has to put socks on at night which alleviates discomfort. She is taking gabapentin 100 mg QHS with minimal relief and notes a reaction on her face (burning sensation) when she previously took 300 mg. She denies dizziness or lightheadedness when taking gabapentin. She notes intense burning pain in the lower back at night,  has been using [employer] with minimal/inconsistent relief, and requests medication that would help with her pain at night. She notes that she had an EMG years ago but was very uncomfortable during the study. Pt at this time desires to proceed with medication evaluation. Pain Scale: /10    PCP: Elli James MD     Past Medical History:   Diagnosis Date    Arthritis     Diabetes ([address]     takes metformin    GERD (gastroesophageal reflux disease)     High cholesterol     Hypertension     Ill-defined condition     high cholesterol    Lumbar pain     Positive PPD     CXR neg        Social History     Socioeconomic History    Marital status: SINGLE     Spouse name: Not on file    Number of children: Not on file    Years of education: Not on file    Highest education level: Not on file   Occupational History    Not on file   Tobacco Use    Smoking status: Current Some Day Smoker     Packs/day: 0.25    Smokeless tobacco: Never Used   Substance and Sexual Activity    Alcohol use: No    Drug use: No    Sexual activity: Not on file   Other Topics Concern    Not on file   Social History Narrative    Not on file     Social Determinants of Health     Financial Resource Strain:     Difficulty of Paying Living Expenses: Not on file   Food Insecurity:     Worried About Running Out of Food in the Last Year: Not on file    Stephanie of Food in the Last Year: Not on file   Transportation Needs:     Lack of Transportation (Medical):  Not on file    Lack of Transportation (Non-Medical): Not on file   Physical Activity:     Days of Exercise per Week: Not on file    Minutes of Exercise per Session: Not on file   Stress:     Feeling of Stress : Not on file   Social Connections:     Frequency of Communication with Friends and Family: Not on file    Frequency of Social Gatherings with Friends and Family: Not on file    Attends Evangelical Services: Not on file    Active Member of [address] [place] [address] or Organizations: Not on file    Attends Club or Organization Meetings: Not on file    Marital Status: Not on file   Intimate Partner Violence:     Fear of Current or Ex-Partner: Not on file    Emotionally Abused: Not on file    Physically Abused: Not on file    Sexually Abused: Not on file   Housing Stability:     Unable to Pay for Housing in the Last Year: Not on file    Number of [place] in the Last Year: Not on file    Unstable Housing in the Last Year: Not on file       Current Outpatient Medications   Medication Sig Dispense Refill    acetaminophen-codeine (Tylenol-Codeine #3) 300-30 mg per tablet Take 1 Tablet by mouth every six (6) hours as needed for Pain for up to 7 days. Max Daily Amount: 4 Tablets. 28 Tablet 0    loratadine 10 mg cap Take  by mouth.  multivitamin (ONE A DAY) tablet Take 1 Tab by mouth daily.  losartan (COZAAR) 25 mg tablet Take 25 mg by mouth daily.  gabapentin (NEURONTIN) 300 mg capsule Take 1 Cap by mouth three (3) times daily. Indications: NEUROPATHIC PAIN (Patient taking differently: Take 500 mg by mouth three (3) times daily as needed. Indications: Neuropathic Pain) 270 Cap 2    metFORMIN (GLUCOPHAGE) 1,000 mg tablet Take 1,000 mg by mouth two (2) times daily (with meals).  amLODIPine (NORVASC) 5 mg tablet Take 5 mg by mouth daily.  acetaZOLAMIDE SR (DIAMOX) 500 mg capsule Take 500 mg by mouth daily.       predniSONE (DELTASONE) 10 mg tablet 2 pills in am for 3 days, then 1 pill in am for 3 days and 1/2 pill in am for remainder (Patient not taking: Reported on 6/28/2022) 11 Tablet 0    tiZANidine (ZANAFLEX) 4 mg tablet Take 1 Tab by mouth two (2) times daily as needed (muscle spasm). (Patient not taking: Reported on 6/28/2022) 30 Tab 1    HYDROcodone-acetaminophen (NORCO) 5-325 mg per tablet 1 po bid as needed for severe pain (Patient not taking: Reported on 5/11/2022) 14 Tab 0    atorvastatin (LIPITOR) 10 mg tablet Take 10 mg by mouth daily. (Patient not taking: Reported on 6/28/2022)      aspirin delayed-release 81 mg tablet Take  by mouth daily. (Patient not taking: Reported on 5/11/2022)      traZODone (DESYREL) 50 mg tablet Take 50 mg by mouth nightly. (Patient not taking: Reported on 6/28/2022)         Allergies   Allergen Reactions    Latex, Natural Rubber Itching    Benazepril Hcl Itching    Lotrel [Amlodipine-Benazepril] Shortness of Breath         REVIEW OF SYSTEMS    Constitutional: Negative for fever, chills, or weight change. Respiratory: Negative for cough or shortness of breath. Cardiovascular: Negative for chest pain or palpitations. Gastrointestinal: Negative for acid reflux, change in bowel habits, or constipation. Genitourinary: Negative for dysuria and flank pain. Musculoskeletal: Positive for lumbar pain. Skin: Negative for rash. Neurological: Negative for headaches, dizziness,positive for numbness. Endo/Heme/Allergies: Negative for increased bruising. Psychiatric/Behavioral: Positive for difficulty with sleep. As per HPI    PHYSICAL EXAMINATION  Visit Vitals  /60   Pulse 80   Temp 97 °F (36.1 °C) (Temporal)   Resp 16   Ht 5' 2\" (1.575 m)   Wt 152 lb (68.9 kg)   SpO2 97%   BMI 27.80 kg/m²       Constitutional: Awake, alert, and in no acute distress. Neurological: 1+ symmetrical DTRs in the upper extremities. 1+ symmetrical DTRs in the lower extremities. Sensation to light touch is intact. Negative Mock's sign bilaterally. Skin: warm, dry, and intact. Musculoskeletal: No pain with extension, axial loading, or forward flexion. Mild  pain with internal / external rotation of her hips. Negative straight leg raise bilaterally. Hip Flex  Quads Hamstrings Ankle DF EHL Ankle PF   Right +4/5 +4/5 +4/5 +4/5 +4/5 +4/5   Left +4/5 +4/5 +4/5 +4/5 +4/5 +4/5     IMAGING:    Lumbar spine MRI from 5/29/2022 was personally reviewed with the patient and demonstrated:  MRI Results (most recent):  Results from Orders Only encounter on 05/11/22    MRI LUMB SPINE WO CONT    Narrative  Sagittal and axial multisequence MR images of lumbar spine were obtained. HISTORY: Back pain and right leg pain. COMPARISON: MRI February 29, 2012    Mild retrolisthesis L5, stable. No compression deformity. No pathologic marrow  signal except for mild discogenic endplate edema at V3-O9. Conus medullaris ends  at L1 with normal morphology and signal intensity. Paraspinous soft tissues  unremarkable. L1-L2 and L2-L3: No disc herniation or central stenosis. No foraminal stenosis. L3-L4: Mild posterior disc bulge with no central stenosis. Mild facet and  ligamentous hypertrophy with no significant foraminal stenosis. Mild facet  inflammation. L4-L5: Posterior disc bulge. More severe facet arthropathy with thickening of  the ligamentum flavum, worse on the left. Mild effacement left lateral recess  and potential compression of the left descending L5 nerve root. No central  stenosis. Mild bilateral foraminal narrowing with no nerve root compression. Moderate facet inflammation    L5-S1: Diffuse posterior disc bulge with endplate spondylosis. No central  stenosis. Facet arthropathy with hypertrophy and thickening of ligamentum  flavum, facet inflammation. Moderately severe to severe bilateral foraminal  stenosis and suspected compression of bilateral exiting L5 nerve root,  perhaps  slightly worse than before. Impression  1. No focal right-sided disease.   2. Mostly facet arthropathy, with hypertrophy and inflammation, worst at L5-S1  with foraminal stenosis and compression of bilateral exiting L5 nerve roots. 3. Suspect left-sided disease at L4-L5      BL Hip XR from 5/27/2022 was personally reviewed with the patient and demonstrated:  XR Results (most recent):  Results from Hospital Encounter encounter on 05/27/22    XR HIPS BI W OR WO AP PELV    Narrative  HISTORY: Bilateral hip pain. Exam: Bilateral hips. Technique: 2 views of bilateral hip and single view pelvis. FINDINGS: No acute fracture, dislocation or radiopaque foreign bodies. Moderate  osteoarthritis of bilateral hip joints right more so than left. Soft tissues are  unremarkable. Impression  1. Moderate osteoarthritis of bilateral hip joints right more than left. No  acute fracture or dislocation. Cervical spine x-rays from 2013 were personally reviewed with the Pt and demonstrated:  DISH and degenerative facets.     Written by [employer], as dictated by Neema Vogel MD.  I, Dr. Neema Vogel confirm that all documentation is accurate.

## 2022-06-28 NOTE — LETTER
6/29/2022    Patient: Karuna Bauman   YOB: 1945   Date of Visit: 6/28/2022     Adam East MD  [address]  Via Fax: [phone]    Dear Adam East MD,      Thank you for referring Ms. Ryan Lott to [address] [place] [address] [facility] for evaluation. My notes for this consultation are attached. If you have questions, please do not hesitate to call me. I look forward to following your patient along with you.       Sincerely,    Hilda Buck MD [Follow-Up: _____] : a [unfilled] follow-up visit

## 2022-06-28 NOTE — PATIENT INSTRUCTIONS
Low Back Arthritis: Exercises  Introduction  Here are some examples of typical rehabilitation exercises for your condition. Start each exercise slowly. Ease off the exercise if you start to have pain. Your doctor or physical therapist will tell you when you can start these exercises and which ones will work best for you. When you are not being active, find a comfortable position for rest. Some people are comfortable on the floor or a medium-firm bed with a small pillow under their head and another under their knees. Some people prefer to lie on their side with a pillow between their knees. Don't stay in one position for too long. Take short walks (10 to 20 minutes) every 2 to 3 hours. Avoid slopes, hills, and stairs until you feel better. Walk only distances you can manage without pain, especially leg pain. How to do the exercises  Pelvic tilt    1. Lie on your back with your knees bent. 2. \"Brace\" your stomachtighten your muscles by pulling in and imagining your belly button moving toward your spine. 3. Press your lower back into the floor. You should feel your hips and pelvis rock back. 4. Hold for 6 seconds while breathing smoothly. 5. Relax and allow your pelvis and hips to rock forward. 6. Repeat 8 to 12 times. Back stretches    1. Get down on your hands and knees on the floor. 2. Relax your head and allow it to droop. Round your back up toward the ceiling until you feel a nice stretch in your upper, middle, and lower back. Hold this stretch for as long as it feels comfortable, or about 15 to 30 seconds. 3. Return to the starting position with a flat back while you are on your hands and knees. 4. Let your back sway by pressing your stomach toward the floor. Lift your buttocks toward the ceiling. 5. Hold this position for 15 to 30 seconds. 6. Repeat 2 to 4 times. Follow-up care is a key part of your treatment and safety.  Be sure to make and go to all appointments, and call your doctor if you are having problems. It's also a good idea to know your test results and keep a list of the medicines you take. Where can you learn more? Go to http://www.[employer].com/  Enter T094 in the search box to learn more about \"Low Back Arthritis: Exercises. \"  Current as of: July 1, 2021               Content Version: 13.2  © 2006-2022 [employer]. Care instructions adapted under license by [employer] (which disclaims liability or warranty for this information). If you have questions about a medical condition or this instruction, always ask your healthcare professional. [place] [address] any warranty or liability for your use of this information.

## 2022-07-25 ENCOUNTER — OFFICE VISIT (OUTPATIENT)
Dept: ORTHOPEDIC SURGERY | Age: 77
End: 2022-07-25
Payer: MEDICARE

## 2022-07-25 VITALS
WEIGHT: 154 LBS | DIASTOLIC BLOOD PRESSURE: 75 MMHG | OXYGEN SATURATION: 98 % | HEIGHT: 62 IN | TEMPERATURE: 96.5 F | BODY MASS INDEX: 28.34 KG/M2 | HEART RATE: 76 BPM | SYSTOLIC BLOOD PRESSURE: 121 MMHG | RESPIRATION RATE: 16 BRPM

## 2022-07-25 DIAGNOSIS — M48.061 SPINAL STENOSIS OF LUMBAR REGION WITHOUT NEUROGENIC CLAUDICATION: Primary | ICD-10-CM

## 2022-07-25 DIAGNOSIS — G62.9 NEUROPATHY: ICD-10-CM

## 2022-07-25 DIAGNOSIS — G57.92 NEURITIS OF LEFT LOWER EXTREMITY: ICD-10-CM

## 2022-07-25 DIAGNOSIS — R26.9 GAIT ABNORMALITY: ICD-10-CM

## 2022-07-25 DIAGNOSIS — M47.816 LUMBAR FACET ARTHROPATHY: ICD-10-CM

## 2022-07-25 PROCEDURE — 1090F PRES/ABSN URINE INCON ASSESS: CPT | Performed by: PHYSICAL MEDICINE & REHABILITATION

## 2022-07-25 PROCEDURE — G8417 CALC BMI ABV UP PARAM F/U: HCPCS | Performed by: PHYSICAL MEDICINE & REHABILITATION

## 2022-07-25 PROCEDURE — G8427 DOCREV CUR MEDS BY ELIG CLIN: HCPCS | Performed by: PHYSICAL MEDICINE & REHABILITATION

## 2022-07-25 PROCEDURE — G8432 DEP SCR NOT DOC, RNG: HCPCS | Performed by: PHYSICAL MEDICINE & REHABILITATION

## 2022-07-25 PROCEDURE — 99214 OFFICE O/P EST MOD 30 MIN: CPT | Performed by: PHYSICAL MEDICINE & REHABILITATION

## 2022-07-25 PROCEDURE — G8399 PT W/DXA RESULTS DOCUMENT: HCPCS | Performed by: PHYSICAL MEDICINE & REHABILITATION

## 2022-07-25 PROCEDURE — 1101F PT FALLS ASSESS-DOCD LE1/YR: CPT | Performed by: PHYSICAL MEDICINE & REHABILITATION

## 2022-07-25 PROCEDURE — 1123F ACP DISCUSS/DSCN MKR DOCD: CPT | Performed by: PHYSICAL MEDICINE & REHABILITATION

## 2022-07-25 PROCEDURE — G8536 NO DOC ELDER MAL SCRN: HCPCS | Performed by: PHYSICAL MEDICINE & REHABILITATION

## 2022-07-25 RX ORDER — GABAPENTIN 300 MG/1
300 CAPSULE ORAL 3 TIMES DAILY
Qty: 270 CAPSULE | Refills: 2 | Status: CANCELLED | OUTPATIENT
Start: 2022-07-25

## 2022-07-25 RX ORDER — GABAPENTIN 100 MG/1
CAPSULE ORAL
Qty: 270 CAPSULE | Refills: 1 | Status: SHIPPED | OUTPATIENT
Start: 2022-07-25

## 2022-07-25 RX ORDER — METHYLPREDNISOLONE 4 MG/1
TABLET ORAL
Qty: 1 DOSE PACK | Refills: 0 | Status: SHIPPED | OUTPATIENT
Start: 2022-07-25

## 2022-07-25 NOTE — PROGRESS NOTES
[facility]  [address], [address]  Phone: [phone]  Fax: [phone]    Pt's YOB: 1945    ASSESSMENT   Diagnoses and all orders for this visit:    1. Spinal stenosis of lumbar region without neurogenic claudication  -     gabapentin (NEURONTIN) 100 mg capsule; Take 1 cap in the morning and 2 caps in the evening. 2. Neuritis of left lower extremity  -     gabapentin (NEURONTIN) 100 mg capsule; Take 1 cap in the morning and 2 caps in the evening. 3. Lumbar facet arthropathy  -     methylPREDNISolone (MEDROL DOSEPACK) 4 mg tablet; Per dose pack instructions    4. Gait abnormality    5. Neuropathy       IMPRESSION AND PLAN:  Simone Uribe is a 67 y.o. female with history of lumbar pain and presents to the office today for follow up. Pt denies pain currently at this appointment but admits to pain in the evening after days that are more activity based. She is taking gabapentin 100 mg 1 cap QAM and 2 caps QHS with minimal relief and Tylenol #3 with relief at night. 1) Pt was given information on lumbar arthritis exercises. 2) She will continue taking gabapentin 100 mg to 1 cap QAM and 2 caps QHS and received refills at this time. 3) Pt will continue taking Tylenol #3 prn pain to help with severe pain and to allow her to rest and she does not need a refill at this time -- pain agreement and UDS discussed if she needs to continue with this more regularly --- will try to defer on this at this time  4) Ms. Edita Kenney has a reminder for a \"due or due soon\" health maintenance. I have asked that she contact her primary care provider, Barb Bowden MD, for follow-up on this health maintenance. 5)  demonstrated consistency with prescribing. 6) Pt given a Medrol Dose Pack if she has a severe acute flare of pain  Follow-up and Dispositions    Return in about 3 months (around 10/25/2022) for Medication follow up. HISTORY OF PRESENT ILLNESS:  Wolfgang Detnon is a 67 y.o. female with history of lumbar pain and presents to the office today for follow up. Pt denies pain currently at this time but admits to pain in the evening after days that are more activity based. She notes wearing a lumbar support with resting at night but does forget to put it on when moving throughout the day. Pt reports pain in bilateral hips and legs when sleeping, with tightness throughout back and hips. She is taking gabapentin 100 mg 1 cap QAM and 2 caps QHS with some relief and Tylenol #3 with relief at night. She admits to previously being prescribed a Medrol Dosepak which helped with the pain and would like to receive another prescription at this visit. Pt at this time desires to proceed with medication evaluation.     Pain Scale: 0 - No pain/10    PCP: Samantha Zambrano MD     Past Medical History:   Diagnosis Date    Arthritis     Diabetes ([facility] [address]     takes metformin    GERD (gastroesophageal reflux disease)     High cholesterol     Hypertension     Ill-defined condition     high cholesterol    Lumbar pain     Positive PPD     CXR neg        Social History     Socioeconomic History    Marital status: SINGLE     Spouse name: Not on file    Number of children: Not on file    Years of education: Not on file    Highest education level: Not on file   Occupational History    Not on file   Tobacco Use    Smoking status: Some Days     Packs/day: 0.25     Types: Cigarettes    Smokeless tobacco: Never   Substance and Sexual Activity    Alcohol use: No    Drug use: No    Sexual activity: Not on file   Other Topics Concern    Not on file   Social History Narrative    Not on file     Social Determinants of Health     Financial Resource Strain: Not on file   Food Insecurity: Not on file   Transportation Needs: Not on file   Physical Activity: Not on file   Stress: Not on file   Social Connections: Not on file   Intimate Partner Violence: Not on file Housing Stability: Not on file       Current Outpatient Medications   Medication Sig Dispense Refill    gabapentin (NEURONTIN) 100 mg capsule Take 1 cap in the morning and 2 caps in the evening. 270 Capsule 1    methylPREDNISolone (MEDROL DOSEPACK) 4 mg tablet Per dose pack instructions 1 Dose Pack 0    loratadine 10 mg cap Take  by mouth.      predniSONE (DELTASONE) 10 mg tablet 2 pills in am for 3 days, then 1 pill in am for 3 days and 1/2 pill in am for remainder 11 Tablet 0    tiZANidine (ZANAFLEX) 4 mg tablet Take 1 Tab by mouth two (2) times daily as needed (muscle spasm). 30 Tab 1    losartan (COZAAR) 25 mg tablet Take 25 mg by mouth daily. gabapentin (NEURONTIN) 300 mg capsule Take 1 Cap by mouth three (3) times daily. Indications: NEUROPATHIC PAIN (Patient taking differently: Take 500 mg by mouth three (3) times daily as needed. Indications: neuropathic pain) 270 Cap 2    metFORMIN (GLUCOPHAGE) 1,000 mg tablet Take 1,000 mg by mouth two (2) times daily (with meals). atorvastatin (LIPITOR) 10 mg tablet Take 10 mg by mouth in the morning. amLODIPine (NORVASC) 5 mg tablet Take 5 mg by mouth daily. acetaZOLAMIDE SR (DIAMOX) 500 mg capsule Take 500 mg by mouth daily. HYDROcodone-acetaminophen (NORCO) 5-325 mg per tablet 1 po bid as needed for severe pain (Patient not taking: Reported on 7/25/2022) 14 Tab 0    multivitamin (ONE A DAY) tablet Take 1 Tab by mouth daily. (Patient not taking: Reported on 7/25/2022)      aspirin delayed-release 81 mg tablet Take  by mouth daily. (Patient not taking: Reported on 7/25/2022)      traZODone (DESYREL) 50 mg tablet Take 50 mg by mouth nightly. (Patient not taking: Reported on 7/25/2022)         Allergies   Allergen Reactions    Latex, Natural Rubber Itching    Benazepril Hcl Itching    Lotrel [Amlodipine-Benazepril] Shortness of Breath         REVIEW OF SYSTEMS    Constitutional: Negative for fever, chills, or weight change.    Respiratory: Negative for cough or shortness of breath. Cardiovascular: Negative for chest pain or palpitations. Gastrointestinal: Negative for acid reflux, change in bowel habits, or constipation. Genitourinary: Negative for dysuria and flank pain. Musculoskeletal: Positive for lumbar pain. Skin: Negative for rash. Neurological: Negative for headaches, dizziness, or numbness. Endo/Heme/Allergies: Negative for increased bruising. Psychiatric/Behavioral: Positive for difficulty with sleep. As per HPI    PHYSICAL EXAMINATION  Visit Vitals  /75 (BP 1 Location: Left upper arm, BP Patient Position: Sitting, BP Cuff Size: Small adult)   Pulse 76   Temp (!) 96.5 °F (35.8 °C) (Tympanic)   Resp 16   Ht 5' 2\" (1.575 m)   Wt 154 lb (69.9 kg)   SpO2 98%   BMI 28.17 kg/m²       Constitutional: Awake, alert, and in no acute distress. Neurological: Sensation to light touch is intact. Negative Mock's sign bilaterally. Skin: warm, dry, and intact. Musculoskeletal: Pain with extension and axial loading, No pain with forward flexion. No pain with internal or external rotation of her hips. Negative straight leg raise bilaterally. Biceps  Triceps Deltoids Wrist Ext Wrist Flex Hand Intrin   Right +4/5 +4/5 +4/5 +4/5 +4/5 +4/5   Left +4/5 +4/5 +4/5 +4/5 +4/5 +4/5      Hip Flex  Quads Hamstrings Ankle DF EHL Ankle PF   Right +4/5 +4/5 +4/5 +4/5 +4/5 +4/5   Left +4/5 +4/5 +4/5 +4/5 +4/5 +4/5     IMAGING:    Lumbar spine MRI from 5/29/2022 was personally reviewed with the patient and demonstrated:  MRI Results (most recent):  Results from Orders Only encounter on 05/11/22     MRI LUMB SPINE WO CONT     Narrative  Sagittal and axial multisequence MR images of lumbar spine were obtained. HISTORY: Back pain and right leg pain. COMPARISON: MRI February 29, 2012     Mild retrolisthesis L5, stable. No compression deformity. No pathologic marrow  signal except for mild discogenic endplate edema at U2-T9.  Conus medullaris ends  at L1 with normal morphology and signal intensity. Paraspinous soft tissues  unremarkable. L1-L2 and L2-L3: No disc herniation or central stenosis. No foraminal stenosis. L3-L4: Mild posterior disc bulge with no central stenosis. Mild facet and  ligamentous hypertrophy with no significant foraminal stenosis. Mild facet  inflammation. L4-L5: Posterior disc bulge. More severe facet arthropathy with thickening of  the ligamentum flavum, worse on the left. Mild effacement left lateral recess  and potential compression of the left descending L5 nerve root. No central  stenosis. Mild bilateral foraminal narrowing with no nerve root compression. Moderate facet inflammation     L5-S1: Diffuse posterior disc bulge with endplate spondylosis. No central  stenosis. Facet arthropathy with hypertrophy and thickening of ligamentum  flavum, facet inflammation. Moderately severe to severe bilateral foraminal  stenosis and suspected compression of bilateral exiting L5 nerve root,  perhaps  slightly worse than before. Impression  1. No focal right-sided disease. 2. Mostly facet arthropathy, with hypertrophy and inflammation, worst at L5-S1  with foraminal stenosis and compression of bilateral exiting L5 nerve roots. 3. Suspect left-sided disease at L4-L5        BL Hip XR from 5/27/2022 was personally reviewed with the patient and demonstrated:  XR Results (most recent):  Results from Hospital Encounter encounter on 05/27/22     XR HIPS BI W OR WO AP PELV     Narrative  HISTORY: Bilateral hip pain. Exam: Bilateral hips. Technique: 2 views of bilateral hip and single view pelvis. FINDINGS: No acute fracture, dislocation or radiopaque foreign bodies. Moderate  osteoarthritis of bilateral hip joints right more so than left. Soft tissues are  unremarkable. Impression  1. Moderate osteoarthritis of bilateral hip joints right more than left. No  acute fracture or dislocation. Cervical spine x-rays from 2013 were personally reviewed with the Pt and demonstrated:  DISH and degenerative facets. Written by Radha Gilbert, as dictated by Jesús Diaz MD.  I, Dr. Jesús Diaz confirm that all documentation is accurate.

## 2022-07-25 NOTE — PROGRESS NOTES
Chief Complaint   Patient presents with    Follow-up       Pt preferred language for health care discussion is english. Is someone accompanying this pt? no    Is the patient using any DME equipment during [address]? no    Depression Screening:  3 most recent PHQ Screens 10/2/2019 8/21/2019 5/21/2019 12/5/2018   Little interest or pleasure in doing things Not at all Not at all Not at all Not at all   Feeling down, depressed, irritable, or hopeless Not at all Several days Several days Not at all   Total Score PHQ 2 0 1 1 0       Learning Assessment:  No flowsheet data found. Abuse Screening:  No flowsheet data found. Fall Risk  Fall Risk Assessment, last 12 mths 10/2/2019   Able to walk? Yes   Fall in past 12 months? No   Number of falls in past 12 months -   Fall with injury? -           Advance Directive:  1. Do you have an advance directive in place? Patient Reply:no    2. If not, would you like material regarding how to put one in place? Patient Reply: no    2. Per patient no changes to their ACP contact no. Coordination of Care:  1. Have you been to the ER, urgent care clinic since your last visit? Hospitalized since your last visit? no    2. Have you seen or consulted any other health care providers outside of the [address] [place] [address] since your last visit? Include any pap smears or colon screening.  no

## 2022-10-24 NOTE — PROGRESS NOTES
[facility]  Javier Barron., [address] [place] [address], [facility] [address]  Phone: [phone]  Fax: [phone]    Pt's YOB: 1945    ASSESSMENT   Diagnoses and all orders for this visit:    1. Acute midline low back pain with left-sided sciatica  -     methylPREDNISolone (MEDROL DOSEPACK) 4 mg tablet; Per dose pack instructions  -     acetaminophen-codeine (Tylenol-Codeine #3) 300-30 mg per tablet; Take 1 Tablet by mouth every eight (8) hours as needed for Pain for up to 7 days. Max Daily Amount: 3 Tablets. Indications: pain    2. Spinal stenosis of lumbar region without neurogenic claudication    3. Lumbar facet arthropathy  -     methylPREDNISolone (MEDROL DOSEPACK) 4 mg tablet; Per dose pack instructions    4. Muscle spasm    5. Gait abnormality       IMPRESSION AND PLAN:  Zonia Patient is a 67 y.o. female with history of lumbar pain and presents to the office today for follow up. Pt complains of continued pain in the lumbar region with a new onset of pain radiating from the lumbar down the posterior aspect of the left leg, incited by raking leaves. She is taking Neurontin 100 mg 1 cap QAM and 2 caps QHS with some relief and Tylenol #3 with relief at night. 1) Pt was given information on low back arthritis exercises. 2) A Medrol Dosepak was prescribed for acute inflammatory pain. 3) She was given a short trial of Tylenol #3 Q8H prn for pain to take as her pain warrants. 4) Lumbar spine MRI from 05/29/2022 was reviewed with the patient at today's office visit. 5) Ms. Bonita Escobar has a reminder for a \"due or due soon\" health maintenance. I have asked that she contact her primary care provider, Albina Galindo MD, for follow-up on this health maintenance. 6)  demonstrated consistency with prescribing. Follow-up and Dispositions    Return in about 3 months (around 1/25/2023) for Medication follow up.              HISTORY OF PRESENT ILLNESS:  Carolyn Olmedo Sage Burgess is a 67 y.o. female with history of lumbar pain and presents to the office today for follow up. Pt complains of continued pain in the lumbar region with a new onset of pain radiating from the lumbar down the posterior aspect of the left leg, incited by raking leaves. She reports she was doing work in her yard and the day after she could 'hardly walk' due to the intensity of the pain in her lumbar region. Pt is taking Neurontin 100 mg 1 cap QAM and 2 caps QHS with some relief and Tylenol #3 with relief at night. She admits to previously being prescribed a Medrol Dosepak which helped significantly with the pain and would like to receive another prescription at this visit. Pt at this time desires to proceed with medication evaluation.     Pain Scale: 5/10    PCP: Stacy Petit MD     Past Medical History:   Diagnosis Date    Arthritis     Diabetes ([facility] [address]     takes metformin    GERD (gastroesophageal reflux disease)     High cholesterol     Hypertension     Ill-defined condition     high cholesterol    Lumbar pain     Positive PPD     CXR neg        Social History     Socioeconomic History    Marital status: SINGLE     Spouse name: Not on file    Number of children: Not on file    Years of education: Not on file    Highest education level: Not on file   Occupational History    Not on file   Tobacco Use    Smoking status: Some Days     Packs/day: 0.25     Types: Cigarettes    Smokeless tobacco: Never   Substance and Sexual Activity    Alcohol use: No    Drug use: No    Sexual activity: Not on file   Other Topics Concern    Not on file   Social History Narrative    Not on file     Social Determinants of Health     Financial Resource Strain: Not on file   Food Insecurity: Not on file   Transportation Needs: Not on file   Physical Activity: Not on file   Stress: Not on file   Social Connections: Not on file   Intimate Partner Violence: Not on file   Housing Stability: Not on file       Current Outpatient Medications Medication Sig Dispense Refill    methylPREDNISolone (MEDROL DOSEPACK) 4 mg tablet Per dose pack instructions 1 Dose Pack 0    acetaminophen-codeine (Tylenol-Codeine #3) 300-30 mg per tablet Take 1 Tablet by mouth every eight (8) hours as needed for Pain for up to 7 days. Max Daily Amount: 3 Tablets. Indications: pain 28 Tablet 0    gabapentin (NEURONTIN) 100 mg capsule Take 1 cap in the morning and 2 caps in the evening. 270 Capsule 1    methylPREDNISolone (MEDROL DOSEPACK) 4 mg tablet Per dose pack instructions 1 Dose Pack 0    loratadine 10 mg cap Take  by mouth. HYDROcodone-acetaminophen (NORCO) 5-325 mg per tablet 1 po bid as needed for severe pain 14 Tab 0    multivitamin (ONE A DAY) tablet Take 1 Tablet by mouth daily. losartan (COZAAR) 25 mg tablet Take 25 mg by mouth daily. gabapentin (NEURONTIN) 300 mg capsule Take 1 Cap by mouth three (3) times daily. Indications: NEUROPATHIC PAIN (Patient taking differently: Take 500 mg by mouth three (3) times daily as needed. Indications: neuropathic pain) 270 Cap 2    metFORMIN (GLUCOPHAGE) 1,000 mg tablet Take 1,000 mg by mouth two (2) times daily (with meals). atorvastatin (LIPITOR) 10 mg tablet Take 10 mg by mouth in the morning. aspirin delayed-release 81 mg tablet Take  by mouth daily. amLODIPine (NORVASC) 5 mg tablet Take 5 mg by mouth daily. acetaZOLAMIDE SR (DIAMOX) 500 mg capsule Take 500 mg by mouth daily. traZODone (DESYREL) 50 mg tablet Take 50 mg by mouth nightly. predniSONE (DELTASONE) 10 mg tablet 2 pills in am for 3 days, then 1 pill in am for 3 days and 1/2 pill in am for remainder (Patient not taking: Reported on 10/25/2022) 11 Tablet 0    tiZANidine (ZANAFLEX) 4 mg tablet Take 1 Tab by mouth two (2) times daily as needed (muscle spasm).  30 Tab 1       Allergies   Allergen Reactions    Latex, Natural Rubber Itching    Benazepril Hcl Itching    Lotrel [Amlodipine-Benazepril] Shortness of Breath REVIEW OF SYSTEMS    Constitutional: Negative for fever, chills, or weight change. Respiratory: Negative for cough or shortness of breath. Cardiovascular: Negative for chest pain or palpitations. Gastrointestinal: Negative for acid reflux, change in bowel habits, or constipation. Genitourinary: Negative for dysuria and flank pain. Musculoskeletal: Positive for lumbar and left leg pain. Skin: Negative for rash. Neurological: Negative for headaches, dizziness, or numbness. Endo/Heme/Allergies: Negative for increased bruising. Psychiatric/Behavioral: Negative for difficulty with sleep. As per HPI    PHYSICAL EXAMINATION  Visit Vitals  /71 (BP 1 Location: Right arm, BP Patient Position: Sitting, BP Cuff Size: Adult)   Pulse 81   Temp (!) 96.7 °F (35.9 °C) (Temporal)   Resp 16   Ht 5' 2\" (1.575 m)   Wt 155 lb (70.3 kg)   SpO2 96%   BMI 28.35 kg/m²       Constitutional: Awake, alert, and in no acute distress. Neurological: 1+ symmetrical DTRs in the upper extremities. 1+ symmetrical DTRs in the lower extremities. Sensation to light touch is intact. Negative Mock's sign bilaterally. Skin: warm, dry, and intact. Musculoskeletal: Mild pain with extension, axial loading, less with forward flexion. No pain with internal or external rotation of her hips. Negative straight leg raise bilaterally. Biceps  Triceps Deltoids Wrist Ext Wrist Flex Hand Intrin   Right +4/5 +4/5 +4/5 +4/5 +4/5 +4/5   Left +4/5 +4/5 +4/5 +4/5 +4/5 +4/5      Hip Flex  Quads Hamstrings Ankle DF EHL Ankle PF   Right +4/5 +4/5 +4/5 +4/5 +4/5 +4/5   Left +4/5 +4/5 +4/5 +4/5 +4/5 +4/5     IMAGING:    Lumbar spine MRI from 5/29/2022 was personally reviewed with the patient and demonstrated:  MRI Results (most recent):  Results from Orders Only encounter on 05/11/22     MRI LUMB SPINE WO CONT     Narrative  Sagittal and axial multisequence MR images of lumbar spine were obtained.      HISTORY: Back pain and right leg pain.     COMPARISON: MRI February 29, 2012     Mild retrolisthesis L5, stable. No compression deformity. No pathologic marrow  signal except for mild discogenic endplate edema at M7-E0. Conus medullaris ends  at L1 with normal morphology and signal intensity. Paraspinous soft tissues  unremarkable. L1-L2 and L2-L3: No disc herniation or central stenosis. No foraminal stenosis. L3-L4: Mild posterior disc bulge with no central stenosis. Mild facet and  ligamentous hypertrophy with no significant foraminal stenosis. Mild facet  inflammation. L4-L5: Posterior disc bulge. More severe facet arthropathy with thickening of  the ligamentum flavum, worse on the left. Mild effacement left lateral recess  and potential compression of the left descending L5 nerve root. No central  stenosis. Mild bilateral foraminal narrowing with no nerve root compression. Moderate facet inflammation     L5-S1: Diffuse posterior disc bulge with endplate spondylosis. No central  stenosis. Facet arthropathy with hypertrophy and thickening of ligamentum  flavum, facet inflammation. Moderately severe to severe bilateral foraminal  stenosis and suspected compression of bilateral exiting L5 nerve root,  perhaps  slightly worse than before. Impression  1. No focal right-sided disease. 2. Mostly facet arthropathy, with hypertrophy and inflammation, worst at L5-S1  with foraminal stenosis and compression of bilateral exiting L5 nerve roots. 3. Suspect left-sided disease at L4-L5        BL Hip XR from 5/27/2022 was personally reviewed with the patient and demonstrated:  XR Results (most recent):  Results from Hospital Encounter encounter on 05/27/22     XR HIPS BI W OR WO AP PELV     Narrative  HISTORY: Bilateral hip pain. Exam: Bilateral hips. Technique: 2 views of bilateral hip and single view pelvis. FINDINGS: No acute fracture, dislocation or radiopaque foreign bodies.  Moderate  osteoarthritis of bilateral hip joints right more so than left. Soft tissues are  unremarkable. Impression  1. Moderate osteoarthritis of bilateral hip joints right more than left. No  acute fracture or dislocation. Cervical spine x-rays from 2013 were personally reviewed with the Pt and demonstrated:  DISH and degenerative facets. Written by Crissy Cabrera, as dictated by Jamal Graham MD.  I, Dr. Jamal Graham confirm that all documentation is accurate.

## 2022-10-25 ENCOUNTER — OFFICE VISIT (OUTPATIENT)
Dept: ORTHOPEDIC SURGERY | Age: 77
End: 2022-10-25
Payer: MEDICARE

## 2022-10-25 VITALS
WEIGHT: 155 LBS | DIASTOLIC BLOOD PRESSURE: 71 MMHG | TEMPERATURE: 96.7 F | HEART RATE: 81 BPM | RESPIRATION RATE: 16 BRPM | SYSTOLIC BLOOD PRESSURE: 126 MMHG | OXYGEN SATURATION: 96 % | BODY MASS INDEX: 28.52 KG/M2 | HEIGHT: 62 IN

## 2022-10-25 DIAGNOSIS — M54.42 ACUTE MIDLINE LOW BACK PAIN WITH LEFT-SIDED SCIATICA: Primary | ICD-10-CM

## 2022-10-25 DIAGNOSIS — M48.061 SPINAL STENOSIS OF LUMBAR REGION WITHOUT NEUROGENIC CLAUDICATION: ICD-10-CM

## 2022-10-25 DIAGNOSIS — M47.816 LUMBAR FACET ARTHROPATHY: ICD-10-CM

## 2022-10-25 DIAGNOSIS — M62.838 MUSCLE SPASM: ICD-10-CM

## 2022-10-25 DIAGNOSIS — R26.9 GAIT ABNORMALITY: ICD-10-CM

## 2022-10-25 PROCEDURE — 1090F PRES/ABSN URINE INCON ASSESS: CPT | Performed by: PHYSICAL MEDICINE & REHABILITATION

## 2022-10-25 PROCEDURE — G8399 PT W/DXA RESULTS DOCUMENT: HCPCS | Performed by: PHYSICAL MEDICINE & REHABILITATION

## 2022-10-25 PROCEDURE — 1101F PT FALLS ASSESS-DOCD LE1/YR: CPT | Performed by: PHYSICAL MEDICINE & REHABILITATION

## 2022-10-25 PROCEDURE — G8432 DEP SCR NOT DOC, RNG: HCPCS | Performed by: PHYSICAL MEDICINE & REHABILITATION

## 2022-10-25 PROCEDURE — G8427 DOCREV CUR MEDS BY ELIG CLIN: HCPCS | Performed by: PHYSICAL MEDICINE & REHABILITATION

## 2022-10-25 PROCEDURE — G8417 CALC BMI ABV UP PARAM F/U: HCPCS | Performed by: PHYSICAL MEDICINE & REHABILITATION

## 2022-10-25 PROCEDURE — G8536 NO DOC ELDER MAL SCRN: HCPCS | Performed by: PHYSICAL MEDICINE & REHABILITATION

## 2022-10-25 PROCEDURE — 1123F ACP DISCUSS/DSCN MKR DOCD: CPT | Performed by: PHYSICAL MEDICINE & REHABILITATION

## 2022-10-25 PROCEDURE — 99214 OFFICE O/P EST MOD 30 MIN: CPT | Performed by: PHYSICAL MEDICINE & REHABILITATION

## 2022-10-25 RX ORDER — METHYLPREDNISOLONE 4 MG/1
TABLET ORAL
Qty: 1 DOSE PACK | Refills: 0 | Status: SHIPPED | OUTPATIENT
Start: 2022-10-25

## 2022-10-25 RX ORDER — ACETAMINOPHEN AND CODEINE PHOSPHATE 300; 30 MG/1; MG/1
1 TABLET ORAL
Qty: 28 TABLET | Refills: 0 | Status: SHIPPED | OUTPATIENT
Start: 2022-10-25 | End: 2022-11-01

## 2022-10-25 RX ORDER — ACETAMINOPHEN AND CODEINE PHOSPHATE 300; 30 MG/1; MG/1
1 TABLET ORAL EVERY 8 HOURS
Qty: 28 TABLET | Refills: 0 | Status: SHIPPED | OUTPATIENT
Start: 2022-10-25 | End: 2022-10-25 | Stop reason: SDUPTHER

## 2022-10-25 NOTE — PROGRESS NOTES
Chief Complaint   Patient presents with    Follow-up       Pt preferred language for health care discussion is english. Is someone accompanying this pt? no    Is the patient using any DME equipment during [address]? no    Depression Screening:  3 most recent PHQ Screens 10/2/2019 8/21/2019 5/21/2019 12/5/2018   Little interest or pleasure in doing things Not at all Not at all Not at all Not at all   Feeling down, depressed, irritable, or hopeless Not at all Several days Several days Not at all   Total Score PHQ 2 0 1 1 0       Learning Assessment:  No flowsheet data found. Abuse Screening:  No flowsheet data found. Fall Risk  Fall Risk Assessment, last 12 mths 10/2/2019   Able to walk? Yes   Fall in past 12 months? No   Number of falls in past 12 months -   Fall with injury? -           Advance Directive:  1. Do you have an advance directive in place? Patient Reply:no    2. If not, would you like material regarding how to put one in place? Patient Reply: no    2. Per patient no changes to their ACP contact no. Coordination of Care:  1. Have you been to the ER, urgent care clinic since your last visit? Hospitalized since your last visit? no    2. Have you seen or consulted any other health care providers outside of the [address] [place] [address] since your last visit? Include any pap smears or colon screening.  no

## 2023-01-25 ENCOUNTER — OFFICE VISIT (OUTPATIENT)
Dept: ORTHOPEDIC SURGERY | Age: 78
End: 2023-01-25
Payer: MEDICARE

## 2023-01-25 VITALS
OXYGEN SATURATION: 96 % | HEART RATE: 73 BPM | HEIGHT: 62 IN | BODY MASS INDEX: 27.6 KG/M2 | TEMPERATURE: 97.3 F | DIASTOLIC BLOOD PRESSURE: 71 MMHG | WEIGHT: 150 LBS | RESPIRATION RATE: 16 BRPM | SYSTOLIC BLOOD PRESSURE: 128 MMHG

## 2023-01-25 DIAGNOSIS — M48.061 SPINAL STENOSIS OF LUMBAR REGION WITHOUT NEUROGENIC CLAUDICATION: ICD-10-CM

## 2023-01-25 DIAGNOSIS — M62.838 MUSCLE SPASM: ICD-10-CM

## 2023-01-25 DIAGNOSIS — M47.816 LUMBAR FACET ARTHROPATHY: ICD-10-CM

## 2023-01-25 DIAGNOSIS — M25.512 ACUTE PAIN OF LEFT SHOULDER: Primary | ICD-10-CM

## 2023-01-25 DIAGNOSIS — M19.012 OSTEOARTHRITIS OF LEFT SHOULDER, UNSPECIFIED OSTEOARTHRITIS TYPE: ICD-10-CM

## 2023-01-25 PROCEDURE — G8417 CALC BMI ABV UP PARAM F/U: HCPCS | Performed by: PHYSICAL MEDICINE & REHABILITATION

## 2023-01-25 PROCEDURE — G8399 PT W/DXA RESULTS DOCUMENT: HCPCS | Performed by: PHYSICAL MEDICINE & REHABILITATION

## 2023-01-25 PROCEDURE — 1101F PT FALLS ASSESS-DOCD LE1/YR: CPT | Performed by: PHYSICAL MEDICINE & REHABILITATION

## 2023-01-25 PROCEDURE — 99214 OFFICE O/P EST MOD 30 MIN: CPT | Performed by: PHYSICAL MEDICINE & REHABILITATION

## 2023-01-25 PROCEDURE — G8536 NO DOC ELDER MAL SCRN: HCPCS | Performed by: PHYSICAL MEDICINE & REHABILITATION

## 2023-01-25 PROCEDURE — 1090F PRES/ABSN URINE INCON ASSESS: CPT | Performed by: PHYSICAL MEDICINE & REHABILITATION

## 2023-01-25 PROCEDURE — 1123F ACP DISCUSS/DSCN MKR DOCD: CPT | Performed by: PHYSICAL MEDICINE & REHABILITATION

## 2023-01-25 PROCEDURE — G8427 DOCREV CUR MEDS BY ELIG CLIN: HCPCS | Performed by: PHYSICAL MEDICINE & REHABILITATION

## 2023-01-25 PROCEDURE — G8432 DEP SCR NOT DOC, RNG: HCPCS | Performed by: PHYSICAL MEDICINE & REHABILITATION

## 2023-01-25 NOTE — LETTER
1/26/2023    Patient: Emile Jonas   YOB: 1945   Date of Visit: 1/25/2023     Fox Hawkins MD  [address] [address]  Via Fax: [phone]    Dear Fox Hawkins MD,      Thank you for referring Ms. Zuhair Al to [address] [place] [address] [facility] for evaluation. My notes for this consultation are attached. If you have questions, please do not hesitate to call me. I look forward to following your patient along with you.       Sincerely,    Tom Bonilla MD

## 2023-01-25 NOTE — PROGRESS NOTES
Chief Complaint   Patient presents with    Follow-up       Pt preferred language for health care discussion is english. Is someone accompanying this pt? no    Is the patient using any DME equipment during [address]? no    Depression Screening:  3 most recent PHQ Screens 10/2/2019 8/21/2019 5/21/2019 12/5/2018   Little interest or pleasure in doing things Not at all Not at all Not at all Not at all   Feeling down, depressed, irritable, or hopeless Not at all Several days Several days Not at all   Total Score PHQ 2 0 1 1 0       Learning Assessment:  No flowsheet data found. Abuse Screening:  No flowsheet data found. Fall Risk  Fall Risk Assessment, last 12 mths 10/2/2019   Able to walk? Yes   Fall in past 12 months? No   Number of falls in past 12 months -   Fall with injury? -           Advance Directive:  1. Do you have an advance directive in place? Patient Reply:no    2. If not, would you like material regarding how to put one in place? Patient Reply: no    2. Per patient no changes to their ACP contact no. Coordination of Care:  1. Have you been to the ER, urgent care clinic since your last visit? Hospitalized since your last visit? no    2. Have you seen or consulted any other health care providers outside of the [address] [place] [address] since your last visit? Include any pap smears or colon screening.  no

## 2023-01-25 NOTE — PATIENT INSTRUCTIONS
Low Back Arthritis: Exercises  Introduction  Here are some examples of typical rehabilitation exercises for your condition. Start each exercise slowly. Ease off the exercise if you start to have pain. Your doctor or physical therapist will tell you when you can start these exercises and which ones will work best for you. When you are not being active, find a comfortable position for rest. Some people are comfortable on the floor or a medium-firm bed with a small pillow under their head and another under their knees. Some people prefer to lie on their side with a pillow between their knees. Don't stay in one position for too long. Take short walks (10 to 20 minutes) every 2 to 3 hours. Avoid slopes, hills, and stairs until you feel better. Walk only distances you can manage without pain, especially leg pain. How to do the exercises  Pelvic tilt  Lie on your back with your knees bent. \"Brace\" your stomach--tighten your muscles by pulling in and imagining your belly button moving toward your spine. Press your lower back into the floor. You should feel your hips and pelvis rock back. Hold for 6 seconds while breathing smoothly. Relax and allow your pelvis and hips to rock forward. Repeat 8 to 12 times. Back stretches  Get down on your hands and knees on the floor. Relax your head and allow it to droop. Round your back up toward the ceiling until you feel a nice stretch in your upper, middle, and lower back. Hold this stretch for as long as it feels comfortable, or about 15 to 30 seconds. Return to the starting position with a flat back while you are on your hands and knees. Let your back sway by pressing your stomach toward the floor. Lift your buttocks toward the ceiling. Hold this position for 15 to 30 seconds. Repeat 2 to 4 times. Follow-up care is a key part of your treatment and safety. Be sure to make and go to all appointments, and call your doctor if you are having problems.  It's also a good idea to know your test results and keep a list of the medicines you take. Current as of: March 9, 2022               Content Version: 13.4  © 2006-2022 [employer]. Care instructions adapted under license by [employer] (which disclaims liability or warranty for this information). If you have questions about a medical condition or this instruction, always ask your healthcare professional. [place] [address] any warranty or liability for your use of this information.

## 2023-01-25 NOTE — PROGRESS NOTES
[facility]  [address], [address]  Phone: [phone]  Fax: [phone]    Pt's YOB: 1945    ASSESSMENT   Diagnoses and all orders for this visit:    1. Acute pain of left shoulder  -     MRI SHOULDER LT WO CONT; Future    2. Osteoarthritis of left shoulder, unspecified osteoarthritis type  -     MRI SHOULDER LT WO CONT; Future    3. Muscle spasm    4. Spinal stenosis of lumbar region without neurogenic claudication    5. Lumbar facet arthropathy       IMPRESSION AND PLAN:  Millicent Briones is a 65 y.o. female with history of lumbar pain and presents to the office today for medication follow up. Pt continues to complain of pain in the lumbar region, bilateral hip pain, rightness in the back and hips, and a new onset of pain in the left arm. She is taking Neurontin 100 mg 1 cap QAM and 2 caps QHS with some relief and Tylenol #3 with relief at night. 1) Pt was given information on lumbar arthritis exercises. 2) She will continue taking Neurontin 100 mg 1 cap QAM and 2 caps QHS and does not require refills at this time. 3) A left shoulder MRI was ordered to assess for labral tear, rotator cuff pathology, bursitis and tendonitis   4) Ms. Rodolfo Wang has a reminder for a \"due or due soon\" health maintenance. I have asked that she contact her primary care provider, Daly Cuenca MD, for follow-up on this health maintenance. 5)  demonstrated consistency with prescribing. Follow-up and Dispositions    Return in about 4 weeks (around 2/22/2023) for Diagnostic Test follow up. HISTORY OF PRESENT ILLNESS:  Millicent Briones is a 65 y.o. female with history of lumbar pain and presents to the office today for medication follow up. Pt continues to complain of pain in the lumbar region, bilateral hip pain, rightness in the back and hips, and a new onset of pain in the left arm.  She reports initially feeling the left shoulder pain after undergoing most recent COVID-19 vaccination but it has not resolved itself. Pt mentions following up with a provider secondary to left shoulder pain where she was prescribed Zanaflex 2 mg prn for muscle spasms, underwent x-rays, and given an injection. She states difficulty with sleep due to the pain. Pt denies being on a blood thinner or any known kidney issues. She is taking Neurontin 100 mg 1 cap QAM and 2 caps QHS with some relief and Tylenol #3 with relief at night. Pt at this time desires to proceed with a left shoulder MRI to assess for osteoarthritis. Pain Scale: 9/10    PCP: Radha Mckeon MD     Past Medical History:   Diagnosis Date    Arthritis     Diabetes ([address]     takes metformin    GERD (gastroesophageal reflux disease)     High cholesterol     Hypertension     Ill-defined condition     high cholesterol    Lumbar pain     Positive PPD     CXR neg        Social History     Socioeconomic History    Marital status: SINGLE     Spouse name: Not on file    Number of children: Not on file    Years of education: Not on file    Highest education level: Not on file   Occupational History    Not on file   Tobacco Use    Smoking status: Some Days     Packs/day: 0.25     Types: Cigarettes    Smokeless tobacco: Never   Substance and Sexual Activity    Alcohol use: No    Drug use: No    Sexual activity: Not on file   Other Topics Concern    Not on file   Social History Narrative    Not on file     Social Determinants of Health     Financial Resource Strain: Not on file   Food Insecurity: Not on file   Transportation Needs: Not on file   Physical Activity: Not on file   Stress: Not on file   Social Connections: Not on file   Intimate Partner Violence: Not on file   Housing Stability: Not on file       Current Outpatient Medications   Medication Sig Dispense Refill    gabapentin (NEURONTIN) 100 mg capsule Take 1 cap in the morning and 2 caps in the evening.  270 Capsule 1    loratadine 10 mg cap Take  by mouth. tiZANidine (ZANAFLEX) 4 mg tablet Take 1 Tab by mouth two (2) times daily as needed (muscle spasm). 30 Tab 1    HYDROcodone-acetaminophen (NORCO) 5-325 mg per tablet 1 po bid as needed for severe pain 14 Tab 0    multivitamin (ONE A DAY) tablet Take 1 Tablet by mouth daily. losartan (COZAAR) 25 mg tablet Take 25 mg by mouth daily. metFORMIN (GLUCOPHAGE) 1,000 mg tablet Take 1,000 mg by mouth two (2) times daily (with meals). atorvastatin (LIPITOR) 10 mg tablet Take 10 mg by mouth in the morning. amLODIPine (NORVASC) 5 mg tablet Take 5 mg by mouth daily. acetaZOLAMIDE SR (DIAMOX) 500 mg capsule Take 500 mg by mouth daily. traZODone (DESYREL) 50 mg tablet Take 50 mg by mouth nightly. methylPREDNISolone (MEDROL DOSEPACK) 4 mg tablet Per dose pack instructions (Patient not taking: Reported on 1/25/2023) 1 Dose Pack 0    methylPREDNISolone (MEDROL DOSEPACK) 4 mg tablet Per dose pack instructions (Patient not taking: Reported on 1/25/2023) 1 Dose Pack 0    predniSONE (DELTASONE) 10 mg tablet 2 pills in am for 3 days, then 1 pill in am for 3 days and 1/2 pill in am for remainder (Patient not taking: No sig reported) 11 Tablet 0    gabapentin (NEURONTIN) 300 mg capsule Take 1 Cap by mouth three (3) times daily. Indications: NEUROPATHIC PAIN (Patient taking differently: Take 500 mg by mouth three (3) times daily as needed. Indications: neuropathic pain) 270 Cap 2    aspirin delayed-release 81 mg tablet Take  by mouth daily. (Patient not taking: Reported on 1/25/2023)         Allergies   Allergen Reactions    Latex, Natural Rubber Itching    Benazepril Hcl Itching    Lotrel [Amlodipine-Benazepril] Shortness of Breath         REVIEW OF SYSTEMS    Constitutional: Negative for fever, chills, or weight change. Respiratory: Negative for cough or shortness of breath. Cardiovascular: Negative for chest pain or palpitations.   Gastrointestinal: Negative for acid reflux, change in bowel habits, or constipation. Genitourinary: Negative for dysuria and flank pain. Musculoskeletal: Positive for left shoulder and lumbar pain. Skin: Negative for rash. Neurological: Negative for headaches, dizziness, or numbness. Endo/Heme/Allergies: Negative for increased bruising. Psychiatric/Behavioral: Positive for difficulty with sleep. As per HPI    PHYSICAL EXAMINATION  Visit Vitals  /71 (BP 1 Location: Right arm, BP Patient Position: Sitting, BP Cuff Size: Adult long)   Pulse 73   Temp 97.3 °F (36.3 °C) (Temporal)   Resp 16   Ht 5' 2\" (1.575 m)   Wt 150 lb (68 kg)   SpO2 96%   BMI 27.44 kg/m²       Constitutional: Awake, alert, and in no acute distress. Neurological: 1+ symmetrical DTRs in the upper extremities. 1+ symmetrical DTRs in the lower extremities. Sensation to light touch is intact. Negative Mock's sign bilaterally. Skin: warm, dry, and intact. Musculoskeletal: Left arm weakness. Decreased ROM of the left shoulder. Tenderness to palpation in the left shoulder. No pain with extension, axial loading, or forward flexion. No pain with internal or external rotation of her hips. Negative straight leg raise bilaterally. Biceps  Triceps Deltoids Wrist Ext Wrist Flex Hand Intrin   Right +4/5 +4/5 +4/5 +4/5 +4/5 +4/5   Left +3/5 +3/5 +3/5 +3/5 +3/5 +3/5      Hip Flex  Quads Hamstrings Ankle DF EHL Ankle PF   Right +4/5 +4/5 +4/5 +4/5 +4/5 +4/5   Left +4/5 +4/5 +4/5 +4/5 +4/5 +4/5     IMAGING:    Lumbar spine MRI from 5/29/2022 was personally reviewed with the patient and demonstrated:   COMPARISON: MRI February 29, 2012     Mild retrolisthesis L5, stable. No compression deformity. No pathologic marrow  signal except for mild discogenic endplate edema at J4-P2. Conus medullaris ends at L1 with normal morphology and signal intensity. Paraspinous soft tissues unremarkable. L1-L2 and L2-L3: No disc herniation or central stenosis. No foraminal stenosis.      L3-L4: Mild posterior disc bulge with no central stenosis. Mild facet and  ligamentous hypertrophy with no significant foraminal stenosis. Mild facet  inflammation. L4-L5: Posterior disc bulge. More severe facet arthropathy with thickening of  the ligamentum flavum, worse on the left. Mild effacement left lateral recess  and potential compression of the left descending L5 nerve root. No central  stenosis. Mild bilateral foraminal narrowing with no nerve root compression. Moderate facet inflammation     L5-S1: Diffuse posterior disc bulge with endplate spondylosis. No central  stenosis. Facet arthropathy with hypertrophy and thickening of ligamentum  flavum, facet inflammation. Moderately severe to severe bilateral foraminal  stenosis and suspected compression of bilateral exiting L5 nerve root,  perhaps  slightly worse than before. Impression  1. No focal right-sided disease. 2. Mostly facet arthropathy, with hypertrophy and inflammation, worst at L5-S1  with foraminal stenosis and compression of bilateral exiting L5 nerve roots. 3. Suspect left-sided disease at L4-L5     BL Hip XR from 5/27/2022 was personally reviewed with the patient and demonstrated:   FINDINGS: No acute fracture, dislocation or radiopaque foreign bodies. Moderate osteoarthritis of bilateral hip joints right more so than left. Soft tissues are unremarkable. Impression  1. Moderate osteoarthritis of bilateral hip joints right more than left. No  acute fracture or dislocation. Cervical spine x-rays from 2013 were personally reviewed with the Pt and demonstrated:  DISH and degenerative facets. Written by Sd Vega, as dictated by Nikolay Richard MD.  I, Dr. Nikolay Richard confirm that all documentation is accurate.

## 2023-01-30 ENCOUNTER — TRANSCRIBE ORDERS (OUTPATIENT)
Facility: HOSPITAL | Age: 78
End: 2023-01-30

## 2023-01-30 DIAGNOSIS — M25.512 LEFT SHOULDER PAIN, UNSPECIFIED CHRONICITY: Primary | ICD-10-CM

## 2023-01-30 DIAGNOSIS — M25.512 ACUTE PAIN OF LEFT SHOULDER: ICD-10-CM

## 2023-01-30 DIAGNOSIS — M19.012: ICD-10-CM

## 2023-02-01 DIAGNOSIS — M25.512 ACUTE PAIN OF LEFT SHOULDER: ICD-10-CM

## 2023-02-01 DIAGNOSIS — M19.012 OSTEOARTHRITIS OF LEFT SHOULDER, UNSPECIFIED OSTEOARTHRITIS TYPE: ICD-10-CM

## 2023-02-13 ENCOUNTER — TELEPHONE (OUTPATIENT)
Age: 78
End: 2023-02-13

## 2023-02-13 NOTE — TELEPHONE ENCOUNTER
Patient states she just wanted to let Dr. Ding Session know that she wasn't able to get her MRI scheduled until 2/27/23. The next available appt on 4/10/23 was offered. Patient is asking if she can come in to get the results sooner. Patient can be reached at [phone].

## 2023-02-23 NOTE — TELEPHONE ENCOUNTER
Will call patient on Monday to get her schedule for after her MRI per Dr. ROSA [facility] we will fit her in.

## 2023-02-27 ENCOUNTER — HOSPITAL ENCOUNTER (OUTPATIENT)
Facility: HOSPITAL | Age: 78
Discharge: HOME | End: 2023-03-02
Payer: MEDICAID

## 2023-02-27 DIAGNOSIS — M19.012: ICD-10-CM

## 2023-02-27 DIAGNOSIS — M25.512 ACUTE PAIN OF LEFT SHOULDER: ICD-10-CM

## 2023-02-27 PROCEDURE — 73221 MRI JOINT UPR EXTREM W/O DYE: CPT

## 2023-03-03 RX ORDER — GLIPIZIDE 2.5 MG/1
TABLET, EXTENDED RELEASE ORAL
COMMUNITY
Start: 2023-02-10

## 2023-03-03 RX ORDER — TIZANIDINE 2 MG/1
TABLET ORAL
COMMUNITY
Start: 2023-01-11

## 2023-03-03 RX ORDER — AMLODIPINE BESYLATE 10 MG/1
TABLET ORAL
COMMUNITY
Start: 2023-02-10

## 2023-03-03 RX ORDER — GABAPENTIN 300 MG/1
300 CAPSULE ORAL 3 TIMES DAILY
COMMUNITY
Start: 2017-07-20

## 2023-03-03 RX ORDER — TRAZODONE HYDROCHLORIDE 50 MG/1
50 TABLET ORAL
COMMUNITY

## 2023-03-03 RX ORDER — ASPIRIN 81 MG/1
TABLET ORAL DAILY
COMMUNITY

## 2023-03-03 RX ORDER — ACETAZOLAMIDE 500 MG/1
CAPSULE, EXTENDED RELEASE ORAL
COMMUNITY
Start: 2023-02-10

## 2023-03-03 RX ORDER — LOSARTAN POTASSIUM 25 MG/1
TABLET ORAL
COMMUNITY
Start: 2023-02-10

## 2023-03-03 RX ORDER — ATORVASTATIN CALCIUM 10 MG/1
10 TABLET, FILM COATED ORAL DAILY
COMMUNITY
Start: 2016-04-25

## 2023-03-03 RX ORDER — LORATADINE 10 MG/1
CAPSULE, LIQUID FILLED ORAL
COMMUNITY

## 2023-03-03 RX ORDER — GABAPENTIN 100 MG/1
CAPSULE ORAL
COMMUNITY
Start: 2022-07-25

## 2023-03-03 NOTE — TELEPHONE ENCOUNTER
Called patient [phone] and verified her name and date of birth. I informed her that we can move her appointment up per Dr. Regina Roberson. I made the appointment for 3/20/23 at 930 am at [facility]. Patient verbalized understanding and no further action needed at this time.

## 2023-03-20 ENCOUNTER — OFFICE VISIT (OUTPATIENT)
Age: 78
End: 2023-03-20
Payer: MEDICAID

## 2023-03-20 VITALS
BODY MASS INDEX: 28.34 KG/M2 | HEART RATE: 66 BPM | RESPIRATION RATE: 16 BRPM | HEIGHT: 62 IN | WEIGHT: 154 LBS | SYSTOLIC BLOOD PRESSURE: 130 MMHG | DIASTOLIC BLOOD PRESSURE: 79 MMHG | OXYGEN SATURATION: 98 % | TEMPERATURE: 96.8 F

## 2023-03-20 DIAGNOSIS — M19.012 PRIMARY OSTEOARTHRITIS, LEFT SHOULDER: Primary | ICD-10-CM

## 2023-03-20 DIAGNOSIS — M24.112 LABRAL TEAR OF SHOULDER, DEGENERATIVE, LEFT: ICD-10-CM

## 2023-03-20 DIAGNOSIS — M62.838 OTHER MUSCLE SPASM: ICD-10-CM

## 2023-03-20 DIAGNOSIS — M48.061 SPINAL STENOSIS, LUMBAR REGION WITHOUT NEUROGENIC CLAUDICATION: ICD-10-CM

## 2023-03-20 DIAGNOSIS — M75.102 ROTATOR CUFF TEAR ARTHROPATHY OF LEFT SHOULDER: ICD-10-CM

## 2023-03-20 DIAGNOSIS — M12.812 ROTATOR CUFF TEAR ARTHROPATHY OF LEFT SHOULDER: ICD-10-CM

## 2023-03-20 PROCEDURE — 99214 OFFICE O/P EST MOD 30 MIN: CPT | Performed by: PHYSICAL MEDICINE & REHABILITATION

## 2023-03-20 PROCEDURE — 1123F ACP DISCUSS/DSCN MKR DOCD: CPT | Performed by: PHYSICAL MEDICINE & REHABILITATION

## 2023-03-20 RX ORDER — LIDOCAINE 50 MG/G
1 PATCH TOPICAL DAILY
Qty: 30 PATCH | Refills: 0 | Status: SHIPPED | OUTPATIENT
Start: 2023-03-20 | End: 2023-04-19

## 2023-03-20 ASSESSMENT — PATIENT HEALTH QUESTIONNAIRE - PHQ9
SUM OF ALL RESPONSES TO PHQ QUESTIONS 1-9: 0
SUM OF ALL RESPONSES TO PHQ QUESTIONS 1-9: 0
2. FEELING DOWN, DEPRESSED OR HOPELESS: 0
1. LITTLE INTEREST OR PLEASURE IN DOING THINGS: 0
SUM OF ALL RESPONSES TO PHQ9 QUESTIONS 1 & 2: 0
SUM OF ALL RESPONSES TO PHQ QUESTIONS 1-9: 0
SUM OF ALL RESPONSES TO PHQ QUESTIONS 1-9: 0

## 2023-03-20 NOTE — PROGRESS NOTES
Chief Complaint   Patient presents with    Follow-up     mri       Pt preferred language for health care discussion is english. Is someone accompanying this pt? no    Is the patient using any DME equipment during OV? no    Depression Screening:  No flowsheet data found. Learning Assessment:  No flowsheet data found. Abuse Screening:  No flowsheet data found. Fall Risk  No flowsheet data found. Advance Directive:  1. Do you have an advance directive in place? Patient Reply:no    2. If not, would you like material regarding how to put one in place? Patient Reply: no    2. Per patient no changes to their ACP contact no. Coordination of Care:  1. Have you been to the ER, urgent care clinic since your last visit? Hospitalized since your last visit? no    2. Have you seen or consulted any other health care providers outside of the [address] [place] [address] since your last visit? Include any pap smears or colon screening.  no
left-sided disease at L4-L5      BL Hip XR from 5/27/2022 was personally reviewed with the patient and demonstrated:   FINDINGS:   No acute fracture, dislocation or radiopaque foreign bodies. Moderate osteoarthritis of bilateral hip joints right more so than left. Soft tissues are unremarkable. Impression  1. Moderate osteoarthritis of bilateral hip joints right more than left. No  acute fracture or dislocation. Cervical spine x-rays from 2013 were personally reviewed with the Pt and demonstrated:  DISH and degenerative facets. Written by Onur Wellington, as dictated by Varun Montiel MD.  I, Dr. Varun Montiel confirm that all documentation is accurate.

## 2023-04-04 ENCOUNTER — TELEPHONE (OUTPATIENT)
Age: 78
End: 2023-04-04

## 2023-04-04 NOTE — TELEPHONE ENCOUNTER
This was sent to [facility] on 3/20/23 with confirmation.

## 2023-04-05 NOTE — TELEPHONE ENCOUNTER
Called patient  and use the two identifiers. Communicated to the patient that rx was sent to  [facility] 03/20/23. Patient state that she did get the patches. No further action is needed.

## 2023-04-19 ENCOUNTER — OFFICE VISIT (OUTPATIENT)
Age: 78
End: 2023-04-19

## 2023-04-19 VITALS
RESPIRATION RATE: 18 BRPM | WEIGHT: 151 LBS | BODY MASS INDEX: 27.79 KG/M2 | HEIGHT: 62 IN | HEART RATE: 51 BPM | OXYGEN SATURATION: 99 %

## 2023-04-19 DIAGNOSIS — M75.102 ROTATOR CUFF SYNDROME OF LEFT SHOULDER: Primary | ICD-10-CM

## 2023-04-19 DIAGNOSIS — M19.012 PRIMARY OSTEOARTHRITIS, LEFT SHOULDER: ICD-10-CM

## 2023-04-19 RX ORDER — TRIAMCINOLONE ACETONIDE 40 MG/ML
40 INJECTION, SUSPENSION INTRA-ARTICULAR; INTRAMUSCULAR ONCE
Status: COMPLETED | OUTPATIENT
Start: 2023-04-19 | End: 2023-04-19

## 2023-04-19 RX ADMIN — TRIAMCINOLONE ACETONIDE 40 MG: 40 INJECTION, SUSPENSION INTRA-ARTICULAR; INTRAMUSCULAR at 10:05

## 2023-04-19 NOTE — PROGRESS NOTES
well-developed, well-nourished. HEENT: Normocephalic, atraumatic. Shoulder Examination     R   L  ROM   FF  Full   90  ER  Full   20   IR  Full   Full  Rotator Cuff Pain   Supra  -   +   Infra  -   +   Subscap -   -  Crepitus  -   -  Effusion  -   -  Warmth  -   -   Erythema  -   -  Instability  -   -  AC Joint TTP  -   -  Clavicle   Deformity -   -   TTP  -   -  Proximal Humerus   Deformity -   -   TTP  -   -  Deltoid Strength 5   5  Biceps Strength 5   5  Biceps Deformity -   -  Biceps Groove Pain -   -  Impingement Sign -   -       IMAGING:  Imaging read by myself and interpreted as follows:  X-rays 4 views of the left shoulder were taken the office today. These do not show any significant arthrosis or fractures. An MRI of the left shoulder was also reviewed. This does not show any full-thickness rotator cuff tearing or significant arthritis. ASSESSMENT & PLAN  Diagnosis: Left shoulder rotator cuff pain    Graham Curry has 6 weeks of left shoulder rotator cuff pain. She also has some stiffness. I encouraged her to use the arm for activities as tolerated. She can continue to take the naproxen as needed for the pain. I offered her a corticosteroid injection which she was agreeable to. This was given in the office today. Follow-up as needed should her symptoms persist.    Prescription medication management discussed. Plan was discussed in detail with patient, all questions were answered, and patient voiced understanding of plan.     [facility]  OFFICE PROCEDURE PROGRESS NOTE        Chart reviewed for the following:   IBam MD, have reviewed the History, Physical and updated the Allergic reactions for [facility] performed immediately prior to start of procedure:   Noe Sol MD, have performed the following reviews [facility] prior to the start of the procedure:            * Patient was identified by name and date of birth   * Agreement on

## 2023-05-19 ENCOUNTER — TELEPHONE (OUTPATIENT)
Age: 78
End: 2023-05-19

## 2023-05-19 NOTE — TELEPHONE ENCOUNTER
Fax received from [facility] home delivery pharmacy for gabapentin 100 mg. Last refill 7-25-22 #270 with 1 refill  1 in the morning and 2 at night    Last seen 3-20-23 and did not need a refill at that time.      Pharmacy has been updated

## 2023-06-09 ENCOUNTER — OFFICE VISIT (OUTPATIENT)
Age: 78
End: 2023-06-09

## 2023-06-09 VITALS — BODY MASS INDEX: 27.79 KG/M2 | WEIGHT: 151 LBS | HEIGHT: 62 IN

## 2023-06-09 DIAGNOSIS — M12.812 ROTATOR CUFF TEAR ARTHROPATHY OF LEFT SHOULDER: ICD-10-CM

## 2023-06-09 DIAGNOSIS — M75.102 ROTATOR CUFF SYNDROME OF LEFT SHOULDER: Primary | ICD-10-CM

## 2023-06-09 DIAGNOSIS — M75.102 ROTATOR CUFF TEAR ARTHROPATHY OF LEFT SHOULDER: ICD-10-CM

## 2023-06-09 NOTE — PROGRESS NOTES
[facility] AMBULATORY OFFICE NOTE    Patient: Bashir Foster                MRN: [id number]       SSN: [id number]  YOB: 1945        AGE: 65 y.o. SEX: female  Body mass index is 27.62 kg/m². PCP: Lety Liz MD  06/09/23    Chief Complaint: Left shoulder follow-up    HPI: Bashir Foster is a 65 y.o. female patient who returns to the office today for her left shoulder. The shot has helped greatly. She says most days she has no pain. She only notices pain with quick short movements which she tries to avoid. Past Medical History:   Diagnosis Date    Arthritis     Diabetes ([address]     takes metformin    GERD (gastroesophageal reflux disease)     High cholesterol     Hypertension     Ill-defined condition     high cholesterol    Lumbar pain     Positive PPD     CXR neg       Family History   Problem Relation Age of Onset    Cancer Mother         pancreatic    Cancer Sister         breast    Osteoarthritis Sister     Breast Cancer Sister        Current Outpatient Medications   Medication Sig Dispense Refill    Multiple Vitamin (MULTIVITAMIN ADULT PO) Take 1 tablet by mouth daily      acetaZOLAMIDE (DIAMOX) 500 MG extended release capsule TAKE 1 CAPSULE BY MOUTH ONCE DAILY      amLODIPine (NORVASC) 10 MG tablet TAKE 1 TABLET BY MOUTH ONCE DAILY FOR BLOOD PRESSURE      aspirin 81 MG EC tablet Take by mouth daily      atorvastatin (LIPITOR) 10 MG tablet Take 1 tablet by mouth daily      gabapentin (NEURONTIN) 100 MG capsule Take 1 cap in the morning and 2 caps in the evening.      gabapentin (NEURONTIN) 300 MG capsule Take 1 capsule by mouth 3 times daily.       glipiZIDE (GLUCOTROL XL) 2.5 MG extended release tablet TAKE 1 TABLET BY MOUTH ONCE DAILY WITH BREAKFAST      loratadine (CLARITIN) 10 MG capsule Take by mouth      losartan (COZAAR) 25 MG tablet TAKE 1 TABLET BY MOUTH ONCE DAILY FOR BLOOD PRESSURE      metFORMIN (GLUCOPHAGE) 1000 MG tablet Take 1 tablet

## 2023-06-19 NOTE — PROGRESS NOTES
Negative for dysuria and flank pain. Musculoskeletal: Positive for lumbar pain. Skin: Negative for rash. Neurological: Negative for headaches, dizziness, or numbness. Endo/Heme/Allergies: Negative for increased bruising. Psychiatric/Behavioral: Negative for difficulty with sleep. As per HPI    PHYSICAL EXAMINATION  /72   Pulse 79   Temp 97.8 °F (36.6 °C) (Temporal)   Resp (!) 79   Ht 5' 2\" (1.575 m)   Wt 149 lb 9.6 oz (67.9 kg)   SpO2 95%   BMI 27.36 kg/m²     Constitutional: Awake, alert, and in no acute distress. Neurological: 1+ symmetrical DTRs in the upper extremities. 1+ symmetrical DTRs in the lower extremities. Sensation to light touch is intact. Negative Mccauley's sign bilaterally. Skin: warm, dry, and intact. Musculoskeletal:  No pain with extension, axial loading, or forward flexion. No pain with internal or external rotation of her hips. Negative straight leg raise bilaterally. Biceps  Triceps Deltoids Wrist Ext Wrist Flex Hand Intrin   Right +4/5 +4/5 +4/5 +4/5 +4/5 +4/5   Left +4/5 +4/5 +4/5 +4/5 +4/5 +4/5      Hip Flex  Quads Hamstrings Ankle DF EHL Ankle PF   Right +4/5 +4/5 +4/5 +4/5 +4/5 +4/5   Left +4/5 +4/5 +4/5 +4/5 +4/5 +4/5     IMAGING:    Left shoulder MRI from 03/03/2023 was personally reviewed with the patient and demonstrated:  FINDINGS  ROTATOR CUFF AND ASSOCIATED STRUCTURES  Acromioclavicular joint: Type II acromion with os acromiale configuration. Chronic stress-related changes across the synchondrosis. Broad-based subacromial  spur. Mild/moderate AC joint degenerative change. Bursa: Moderate subacromial/subdeltoid bursitis. Rotator cuff: Mild/moderate rotator cuff tendinopathy without evidence of  significant tear. Musculature: There is no muscular tear, contusion, or atrophy. GLENOHUMERAL JOINT     Mild/moderate glenohumeral degenerative change with circumferential labral  degeneration.  There is a mild glenohumeral joint effusion

## 2023-06-20 ENCOUNTER — OFFICE VISIT (OUTPATIENT)
Age: 78
End: 2023-06-20
Payer: MEDICAID

## 2023-06-20 VITALS
RESPIRATION RATE: 79 BRPM | WEIGHT: 149.6 LBS | HEART RATE: 79 BPM | OXYGEN SATURATION: 95 % | TEMPERATURE: 97.8 F | BODY MASS INDEX: 27.53 KG/M2 | DIASTOLIC BLOOD PRESSURE: 72 MMHG | HEIGHT: 62 IN | SYSTOLIC BLOOD PRESSURE: 118 MMHG

## 2023-06-20 DIAGNOSIS — M48.061 SPINAL STENOSIS, LUMBAR REGION WITHOUT NEUROGENIC CLAUDICATION: ICD-10-CM

## 2023-06-20 DIAGNOSIS — G62.9 NEUROPATHY: Primary | ICD-10-CM

## 2023-06-20 DIAGNOSIS — M19.012 PRIMARY OSTEOARTHRITIS, LEFT SHOULDER: ICD-10-CM

## 2023-06-20 DIAGNOSIS — M62.838 OTHER MUSCLE SPASM: ICD-10-CM

## 2023-06-20 PROBLEM — R19.00 PELVIC MASS: Status: ACTIVE | Noted: 2023-06-20

## 2023-06-20 PROCEDURE — 99213 OFFICE O/P EST LOW 20 MIN: CPT | Performed by: PHYSICAL MEDICINE & REHABILITATION

## 2023-06-20 PROCEDURE — 1123F ACP DISCUSS/DSCN MKR DOCD: CPT | Performed by: PHYSICAL MEDICINE & REHABILITATION

## 2023-06-20 RX ORDER — GABAPENTIN 100 MG/1
CAPSULE ORAL
Qty: 45 CAPSULE | Refills: 0 | Status: SHIPPED | OUTPATIENT
Start: 2023-06-20 | End: 2023-07-05

## 2023-06-20 RX ORDER — ATORVASTATIN CALCIUM 40 MG/1
TABLET, FILM COATED ORAL
COMMUNITY
Start: 2023-04-28

## 2023-06-20 RX ORDER — GABAPENTIN 100 MG/1
CAPSULE ORAL
Qty: 270 CAPSULE | Refills: 1 | Status: SHIPPED | OUTPATIENT
Start: 2023-06-20 | End: 2023-12-07

## 2023-10-06 ENCOUNTER — HOSPITAL ENCOUNTER (OUTPATIENT)
Facility: HOSPITAL | Age: 78
Discharge: HOME | End: 2023-10-09

## 2023-10-06 ENCOUNTER — HOSPITAL ENCOUNTER (OUTPATIENT)
Facility: HOSPITAL | Age: 78
End: 2023-10-06
Payer: MEDICAID

## 2023-10-06 DIAGNOSIS — F17.210 CIGARETTE SMOKER: ICD-10-CM

## 2023-10-06 LAB — LABCORP SPECIMEN COLLECTION: NORMAL

## 2023-10-06 PROCEDURE — 71046 X-RAY EXAM CHEST 2 VIEWS: CPT

## 2023-12-09 DIAGNOSIS — M48.061 SPINAL STENOSIS, LUMBAR REGION WITHOUT NEUROGENIC CLAUDICATION: ICD-10-CM

## 2023-12-09 DIAGNOSIS — G62.9 NEUROPATHY: ICD-10-CM

## 2023-12-12 RX ORDER — GABAPENTIN 100 MG/1
CAPSULE ORAL
Qty: 270 CAPSULE | Refills: 3 | OUTPATIENT
Start: 2023-12-12

## 2023-12-13 ENCOUNTER — TELEMEDICINE (OUTPATIENT)
Age: 78
End: 2023-12-13
Payer: MEDICAID

## 2023-12-13 DIAGNOSIS — M47.816 SPONDYLOSIS WITHOUT MYELOPATHY OR RADICULOPATHY, LUMBAR REGION: ICD-10-CM

## 2023-12-13 DIAGNOSIS — G62.9 NEUROPATHY: Primary | ICD-10-CM

## 2023-12-13 DIAGNOSIS — M48.061 SPINAL STENOSIS, LUMBAR REGION WITHOUT NEUROGENIC CLAUDICATION: ICD-10-CM

## 2023-12-13 PROCEDURE — 99442 PR PHYS/QHP TELEPHONE EVALUATION 11-20 MIN: CPT | Performed by: NURSE PRACTITIONER

## 2023-12-13 RX ORDER — OMEGA-3-ACID ETHYL ESTERS 1 G/1
CAPSULE, LIQUID FILLED ORAL
COMMUNITY
Start: 2023-10-15

## 2023-12-13 RX ORDER — NAPROXEN 250 MG/1
TABLET ORAL
COMMUNITY
Start: 2023-11-14

## 2023-12-13 RX ORDER — GABAPENTIN 100 MG/1
200 CAPSULE ORAL 2 TIMES DAILY
Qty: 360 CAPSULE | Refills: 1 | Status: SHIPPED | OUTPATIENT
Start: 2023-12-13 | End: 2024-06-10

## 2023-12-13 RX ORDER — EVOLOCUMAB 420 MG/3.5
KIT SUBCUTANEOUS
COMMUNITY
Start: 2023-12-09

## 2023-12-13 NOTE — PROGRESS NOTES
Harhsil Ríos presents today for   Chief Complaint   Patient presents with    Pain       Is someone accompanying this pt? no    Is the patient using any DME equipment during OV? no    Depression Screening:       No data to display                Learning Assessment:      Abuse Screening:       No data to display                Fall Risk      OPIOID RISK TOOL      Coordination of Care:  1. Have you been to the ER, urgent care clinic since your last visit? no  Hospitalized since your last visit? no    2. Have you seen or consulted any other health care providers outside of the [address] [place] [address] since your last visit? no Include any pap smears or colon screening.  no

## 2023-12-28 ENCOUNTER — HOSPITAL ENCOUNTER (OUTPATIENT)
Facility: HOSPITAL | Age: 78
Discharge: HOME | End: 2023-12-28
Payer: MEDICAID

## 2023-12-28 DIAGNOSIS — J06.9 UPPER RESPIRATORY TRACT INFECTION, UNSPECIFIED TYPE: ICD-10-CM

## 2023-12-28 PROCEDURE — 71046 X-RAY EXAM CHEST 2 VIEWS: CPT

## 2024-01-15 ENCOUNTER — HOSPITAL ENCOUNTER (OUTPATIENT)
Facility: HOSPITAL | Age: 79
Discharge: HOME | End: 2024-01-18
Payer: MEDICAID

## 2024-01-15 ENCOUNTER — HOSPITAL ENCOUNTER (OUTPATIENT)
Facility: HOSPITAL | Age: 79
Discharge: HOME | End: 2024-01-18

## 2024-01-15 DIAGNOSIS — I70.212 ATHEROSCLEROSIS OF NATIVE ARTERY OF LEFT LOWER EXTREMITY WITH INTERMITTENT CLAUDICATION (HCC): ICD-10-CM

## 2024-01-15 LAB
EKG ATRIAL RATE: 58 BPM
EKG DIAGNOSIS: NORMAL
EKG P AXIS: 38 DEGREES
EKG P-R INTERVAL: 164 MS
EKG Q-T INTERVAL: 386 MS
EKG QRS DURATION: 92 MS
EKG QTC CALCULATION (BAZETT): 378 MS
EKG R AXIS: -9 DEGREES
EKG T AXIS: 39 DEGREES
EKG VENTRICULAR RATE: 58 BPM
LABCORP SPECIMEN COLLECTION: NORMAL

## 2024-01-15 PROCEDURE — 93010 ELECTROCARDIOGRAM REPORT: CPT | Performed by: INTERNAL MEDICINE

## 2024-01-15 PROCEDURE — 93005 ELECTROCARDIOGRAM TRACING: CPT

## 2024-06-06 ENCOUNTER — HOSPITAL ENCOUNTER (OUTPATIENT)
Facility: HOSPITAL | Age: 79
Discharge: HOME | End: 2024-06-06
Payer: MEDICAID

## 2024-06-06 DIAGNOSIS — J06.9 ACUTE RESPIRATORY DISEASE: ICD-10-CM

## 2024-06-06 PROCEDURE — 71046 X-RAY EXAM CHEST 2 VIEWS: CPT

## 2024-11-05 ENCOUNTER — OFFICE VISIT (OUTPATIENT)
Age: 79
End: 2024-11-05

## 2024-11-05 VITALS
SYSTOLIC BLOOD PRESSURE: 124 MMHG | HEART RATE: 59 BPM | DIASTOLIC BLOOD PRESSURE: 60 MMHG | HEIGHT: 62 IN | BODY MASS INDEX: 24.29 KG/M2 | WEIGHT: 132 LBS | OXYGEN SATURATION: 99 %

## 2024-11-05 DIAGNOSIS — E11.9 TYPE 2 DIABETES MELLITUS WITHOUT COMPLICATION, WITH LONG-TERM CURRENT USE OF INSULIN (HCC): ICD-10-CM

## 2024-11-05 DIAGNOSIS — Z79.4 TYPE 2 DIABETES MELLITUS WITHOUT COMPLICATION, WITH LONG-TERM CURRENT USE OF INSULIN (HCC): ICD-10-CM

## 2024-11-05 DIAGNOSIS — I10 PRIMARY HYPERTENSION: ICD-10-CM

## 2024-11-05 DIAGNOSIS — R07.9 CHEST PAIN, UNSPECIFIED TYPE: Primary | ICD-10-CM

## 2024-11-05 DIAGNOSIS — E78.5 DYSLIPIDEMIA: ICD-10-CM

## 2024-11-05 DIAGNOSIS — R55 SYNCOPE, UNSPECIFIED SYNCOPE TYPE: ICD-10-CM

## 2024-11-05 RX ORDER — AMLODIPINE BESYLATE 5 MG/1
5 TABLET ORAL DAILY
Qty: 90 TABLET | Refills: 3 | Status: SHIPPED | OUTPATIENT
Start: 2024-11-05

## 2024-11-05 ASSESSMENT — ANXIETY QUESTIONNAIRES
5. BEING SO RESTLESS THAT IT IS HARD TO SIT STILL: NOT AT ALL
3. WORRYING TOO MUCH ABOUT DIFFERENT THINGS: NOT AT ALL
7. FEELING AFRAID AS IF SOMETHING AWFUL MIGHT HAPPEN: NOT AT ALL
GAD7 TOTAL SCORE: 0
4. TROUBLE RELAXING: NOT AT ALL
6. BECOMING EASILY ANNOYED OR IRRITABLE: NOT AT ALL
1. FEELING NERVOUS, ANXIOUS, OR ON EDGE: NOT AT ALL
2. NOT BEING ABLE TO STOP OR CONTROL WORRYING: NOT AT ALL

## 2024-11-05 ASSESSMENT — PATIENT HEALTH QUESTIONNAIRE - PHQ9
SUM OF ALL RESPONSES TO PHQ QUESTIONS 1-9: 0
1. LITTLE INTEREST OR PLEASURE IN DOING THINGS: NOT AT ALL
SUM OF ALL RESPONSES TO PHQ QUESTIONS 1-9: 0
2. FEELING DOWN, DEPRESSED OR HOPELESS: NOT AT ALL
SUM OF ALL RESPONSES TO PHQ9 QUESTIONS 1 & 2: 0
SUM OF ALL RESPONSES TO PHQ QUESTIONS 1-9: 0
SUM OF ALL RESPONSES TO PHQ QUESTIONS 1-9: 0

## 2024-11-05 ASSESSMENT — ENCOUNTER SYMPTOMS
SORE THROAT: 0
ABDOMINAL PAIN: 0
CHEST TIGHTNESS: 1
NAUSEA: 0
VOMITING: 0
SHORTNESS OF BREATH: 0
COUGH: 0
ABDOMINAL DISTENTION: 0

## 2024-11-05 NOTE — PROGRESS NOTES
Pascale Moses presents today for   Chief Complaint   Patient presents with    New Patient     Referred by pcp for chest pain       Pascale Moses preferred language for health care discussion is english/other.    Is someone accompanying this pt? no    Is the patient using any DME equipment during OV? no    Depression Screening:  Depression: Not at risk (11/5/2024)    PHQ-2     PHQ-2 Score: 0        Learning Assessment:  Who is the primary learner? Patient    What is the preferred language for health care of the primary learner? ENGLISH    How does the primary learner prefer to learn new concepts? DEMONSTRATION    Answered By patient    Relationship to Learner SELF           Pt currently taking Anticoagulant therapy? no    Pt currently taking Antiplatelet therapy ? no      Coordination of Care:  1. Have you been to the ER, urgent care clinic since your last visit? Hospitalized since your last visit? no    2. Have you seen or consulted any other health care providers outside of the [facility] since your last visit? Include any pap smears or colon screening. no    
evaluate for any structural heart disease and a 14-day event monitor to evaluate for any arrhythmias.    Primary hypertension.  Patient blood pressure is normal today in the office.  She states that at home it has been low normal at times.  I have recommended decreasing amlodipine to 5 mg daily and she can continue the losartan 25 mg daily which she takes primarily for renal protection.    Dyslipidemia.  Patient has been managed with atorvastatin 40 mg daily and now Repatha.  I suspect her lipids are quite high based on this regimen.  Her LDL should be as low as possible.    Type 2 diabetes.  Patient has been managed with metformin.  This is followed closely by her PCP.  Her goal hemoglobin A1c should be less than 7.    Tobacco use disorder.  Patient has been smoking on and off for over 40 years.  She is now down to smoking 1/3-1/2 a pack of cigarettes a day.  She was encouraged to try and quit smoking altogether.    Follow-up in 3 months, sooner if needed.        Vickey Yip MD

## 2024-11-08 ENCOUNTER — TELEPHONE (OUTPATIENT)
Age: 79
End: 2024-11-08

## 2024-11-08 RX ORDER — CLOPIDOGREL BISULFATE 75 MG/1
75 TABLET ORAL DAILY
COMMUNITY
Start: 2024-10-21

## 2024-11-18 ENCOUNTER — TELEPHONE (OUTPATIENT)
Age: 79
End: 2024-11-18

## 2024-11-18 NOTE — TELEPHONE ENCOUNTER
Patient came into the office for help putting on event monitor.    Medication list was discussed as well.

## 2024-12-23 ENCOUNTER — TELEPHONE (OUTPATIENT)
Age: 79
End: 2024-12-23

## 2024-12-23 NOTE — TELEPHONE ENCOUNTER
----- Message from Dr. Vickey Yip MD sent at 12/11/2024  4:41 PM EST -----  Please let the patient know that her event monitor looked okay.  There were no arrhythmias.  ----- Message -----  From: Lillian Flores MA  Sent: 12/11/2024   7:38 AM EST  To: Vickey Yip MD    Per your last note \"  Syncope/near syncope.  Possibly orthostatic in nature.  She states her blood pressure has been running a little lower than usual.  I recommended decreasing her amlodipine to 5 mg daily.  I would also like to order an echocardiogram to evaluate for any structural heart disease and a 14-day event monitor to evaluate for any arrhythmias

## 2025-01-23 ENCOUNTER — TELEPHONE (OUTPATIENT)
Age: 80
End: 2025-01-23

## 2025-01-23 NOTE — TELEPHONE ENCOUNTER
----- Message from Dr. Vickey Yip MD sent at 1/21/2025  8:54 AM EST -----  Please let the patient know that her echocardiogram showed that her heart function was slightly below normal, but no other concerning structural heart disease.  I am still awaiting her 14-day event monitor.  ----- Message -----  From: Lillian Flores MA  Sent: 1/21/2025   7:59 AM EST  To: Vickey Yip MD    Per your last note \"  Syncope/near syncope.  Possibly orthostatic in nature.  She states her blood pressure has been running a little lower than usual.  I recommended decreasing her amlodipine to 5 mg daily.  I would also like to order an echocardiogram to evaluate for any structural heart disease and a 14-day event monitor to evaluate for any arrhythmias.

## 2025-01-23 NOTE — TELEPHONE ENCOUNTER
Verbal order and read back per Vickey Yip MD  Please let the patient know that her echocardiogram showed that her heart function was slightly below normal, but no other concerning structural heart disease.  I am still awaiting her 14-day event monitor.     This has been fully explained to the patient, who indicates understanding.

## 2025-02-04 ENCOUNTER — OFFICE VISIT (OUTPATIENT)
Age: 80
End: 2025-02-04
Payer: MEDICARE

## 2025-02-04 VITALS
BODY MASS INDEX: 24.84 KG/M2 | OXYGEN SATURATION: 94 % | DIASTOLIC BLOOD PRESSURE: 70 MMHG | HEART RATE: 57 BPM | WEIGHT: 135 LBS | HEIGHT: 62 IN | SYSTOLIC BLOOD PRESSURE: 128 MMHG

## 2025-02-04 DIAGNOSIS — R55 SYNCOPE, UNSPECIFIED SYNCOPE TYPE: ICD-10-CM

## 2025-02-04 DIAGNOSIS — E78.5 DYSLIPIDEMIA: ICD-10-CM

## 2025-02-04 DIAGNOSIS — E11.9 TYPE 2 DIABETES MELLITUS WITHOUT COMPLICATION, WITH LONG-TERM CURRENT USE OF INSULIN (HCC): ICD-10-CM

## 2025-02-04 DIAGNOSIS — I10 PRIMARY HYPERTENSION: Primary | ICD-10-CM

## 2025-02-04 DIAGNOSIS — I73.9 PAD (PERIPHERAL ARTERY DISEASE) (HCC): ICD-10-CM

## 2025-02-04 DIAGNOSIS — Z79.4 TYPE 2 DIABETES MELLITUS WITHOUT COMPLICATION, WITH LONG-TERM CURRENT USE OF INSULIN (HCC): ICD-10-CM

## 2025-02-04 PROCEDURE — 1126F AMNT PAIN NOTED NONE PRSNT: CPT | Performed by: INTERNAL MEDICINE

## 2025-02-04 PROCEDURE — 1090F PRES/ABSN URINE INCON ASSESS: CPT | Performed by: INTERNAL MEDICINE

## 2025-02-04 PROCEDURE — 4004F PT TOBACCO SCREEN RCVD TLK: CPT | Performed by: INTERNAL MEDICINE

## 2025-02-04 PROCEDURE — G8420 CALC BMI NORM PARAMETERS: HCPCS | Performed by: INTERNAL MEDICINE

## 2025-02-04 PROCEDURE — 99214 OFFICE O/P EST MOD 30 MIN: CPT | Performed by: INTERNAL MEDICINE

## 2025-02-04 PROCEDURE — G8399 PT W/DXA RESULTS DOCUMENT: HCPCS | Performed by: INTERNAL MEDICINE

## 2025-02-04 PROCEDURE — 1123F ACP DISCUSS/DSCN MKR DOCD: CPT | Performed by: INTERNAL MEDICINE

## 2025-02-04 PROCEDURE — 3074F SYST BP LT 130 MM HG: CPT | Performed by: INTERNAL MEDICINE

## 2025-02-04 PROCEDURE — G8427 DOCREV CUR MEDS BY ELIG CLIN: HCPCS | Performed by: INTERNAL MEDICINE

## 2025-02-04 PROCEDURE — 1160F RVW MEDS BY RX/DR IN RCRD: CPT | Performed by: INTERNAL MEDICINE

## 2025-02-04 PROCEDURE — 3078F DIAST BP <80 MM HG: CPT | Performed by: INTERNAL MEDICINE

## 2025-02-04 PROCEDURE — 1159F MED LIST DOCD IN RCRD: CPT | Performed by: INTERNAL MEDICINE

## 2025-02-04 PROCEDURE — 93000 ELECTROCARDIOGRAM COMPLETE: CPT | Performed by: INTERNAL MEDICINE

## 2025-02-04 ASSESSMENT — ANXIETY QUESTIONNAIRES
5. BEING SO RESTLESS THAT IT IS HARD TO SIT STILL: NOT AT ALL
1. FEELING NERVOUS, ANXIOUS, OR ON EDGE: NOT AT ALL
4. TROUBLE RELAXING: NOT AT ALL
3. WORRYING TOO MUCH ABOUT DIFFERENT THINGS: NOT AT ALL
GAD7 TOTAL SCORE: 0
6. BECOMING EASILY ANNOYED OR IRRITABLE: NOT AT ALL
7. FEELING AFRAID AS IF SOMETHING AWFUL MIGHT HAPPEN: NOT AT ALL
2. NOT BEING ABLE TO STOP OR CONTROL WORRYING: NOT AT ALL

## 2025-02-04 ASSESSMENT — ENCOUNTER SYMPTOMS
SORE THROAT: 0
SHORTNESS OF BREATH: 0
CHEST TIGHTNESS: 0
ABDOMINAL PAIN: 0
VOMITING: 0
NAUSEA: 0
COUGH: 0
ABDOMINAL DISTENTION: 0

## 2025-02-04 ASSESSMENT — PATIENT HEALTH QUESTIONNAIRE - PHQ9
SUM OF ALL RESPONSES TO PHQ QUESTIONS 1-9: 0
SUM OF ALL RESPONSES TO PHQ QUESTIONS 1-9: 0
SUM OF ALL RESPONSES TO PHQ9 QUESTIONS 1 & 2: 0
SUM OF ALL RESPONSES TO PHQ QUESTIONS 1-9: 0
SUM OF ALL RESPONSES TO PHQ QUESTIONS 1-9: 0
1. LITTLE INTEREST OR PLEASURE IN DOING THINGS: NOT AT ALL
2. FEELING DOWN, DEPRESSED OR HOPELESS: NOT AT ALL

## 2025-02-04 NOTE — PROGRESS NOTES
02/04/25     Pascale Moses  is a 79 y.o. female     Chief Complaint   Patient presents with    Follow-up     3 month       HPI    Patient presents for a follow-up office visit.  She was initially referred here by her PCP for 2 separate complaints 1 was near syncope/syncope which has been worse when standing up over the past several months.  She feels she may have had a single volodymyr syncopal episode when she collapsed and does not remember falling.  The other times she gets very dizzy and has to sit down quickly.  She also reports chest pain which she describes as a left-sided chest pressure which can occur with both rest and exertion but has not been more noticeable with exertion.  Symptoms typically subside in minutes at a time.  No associated dizziness or diaphoresis.    Patient has a known history of hypertension, dyslipidemia and type 2 diabetes.  She last underwent a stress test in 2021 which was a normal and low risk study.    Patient was last seen in the office 3 months ago.  Since last visit, she underwent an event monitor in December 2024 which showed normal sinus rhythm, occasional PVCs and PACs but no arrhythmias.  An echocardiogram was completed in January 2025 which on my read showed low normal LVEF of 50 to 55%, no regional wall motion abnormalities, mild to moderate tricuspid valve regurgitation with upper normal PA pressures.  She was supposed to have a stress test the same day, but unfortunately drank coffee so this was canceled.    Since last visit, she denies any chest pain, she still has some dizziness if she stands up too quickly but no volodymyr syncope.  She states she underwent peripheral vascular invention by Dr. Madden last fall on her left lower leg and is now maintained on Plavix.    Past Medical History:   Diagnosis Date    Arthritis     Diabetes (HCC)     takes metformin    GERD (gastroesophageal reflux disease)     High cholesterol     Hypertension     Ill-defined condition     high

## 2025-02-04 NOTE — PROGRESS NOTES
Pascale Moses presents today for   Chief Complaint   Patient presents with    Follow-up     3 month       Pascale SAMSON Moses preferred language for health care discussion is english/other.    Is someone accompanying this pt? no    Is the patient using any DME equipment during OV? no    Depression Screening:  Depression: Not at risk (2/4/2025)    PHQ-2     PHQ-2 Score: 0        Learning Assessment:  Who is the primary learner? Patient    What is the preferred language for health care of the primary learner? ENGLISH    How does the primary learner prefer to learn new concepts? DEMONSTRATION    Answered By patient    Relationship to Learner SELF           Pt currently taking Anticoagulant therapy? no    Pt currently taking Antiplatelet therapy ? Plavix 75 mg daily      Coordination of Care:  1. Have you been to the ER, urgent care clinic since your last visit? Hospitalized since your last visit? no    2. Have you seen or consulted any other health care providers outside of the [facility] since your last visit? Include any pap smears or colon screening. no

## 2025-06-05 RX ORDER — AMLODIPINE BESYLATE 5 MG/1
5 TABLET ORAL DAILY
Qty: 100 TABLET | Refills: 2 | Status: SHIPPED | OUTPATIENT
Start: 2025-06-05

## 2025-08-15 ENCOUNTER — OFFICE VISIT (OUTPATIENT)
Age: 80
End: 2025-08-15
Payer: MEDICARE

## 2025-08-15 VITALS
HEART RATE: 64 BPM | SYSTOLIC BLOOD PRESSURE: 128 MMHG | WEIGHT: 143 LBS | DIASTOLIC BLOOD PRESSURE: 65 MMHG | OXYGEN SATURATION: 97 % | BODY MASS INDEX: 26.31 KG/M2 | HEIGHT: 62 IN

## 2025-08-15 DIAGNOSIS — I73.9 PAD (PERIPHERAL ARTERY DISEASE): ICD-10-CM

## 2025-08-15 DIAGNOSIS — E78.5 DYSLIPIDEMIA: ICD-10-CM

## 2025-08-15 DIAGNOSIS — E11.9 TYPE 2 DIABETES MELLITUS WITHOUT COMPLICATION, WITH LONG-TERM CURRENT USE OF INSULIN (HCC): ICD-10-CM

## 2025-08-15 DIAGNOSIS — I10 PRIMARY HYPERTENSION: Primary | ICD-10-CM

## 2025-08-15 DIAGNOSIS — Z79.4 TYPE 2 DIABETES MELLITUS WITHOUT COMPLICATION, WITH LONG-TERM CURRENT USE OF INSULIN (HCC): ICD-10-CM

## 2025-08-15 PROCEDURE — G8427 DOCREV CUR MEDS BY ELIG CLIN: HCPCS | Performed by: INTERNAL MEDICINE

## 2025-08-15 PROCEDURE — 1159F MED LIST DOCD IN RCRD: CPT | Performed by: INTERNAL MEDICINE

## 2025-08-15 PROCEDURE — 93000 ELECTROCARDIOGRAM COMPLETE: CPT | Performed by: INTERNAL MEDICINE

## 2025-08-15 PROCEDURE — 3078F DIAST BP <80 MM HG: CPT | Performed by: INTERNAL MEDICINE

## 2025-08-15 PROCEDURE — 1123F ACP DISCUSS/DSCN MKR DOCD: CPT | Performed by: INTERNAL MEDICINE

## 2025-08-15 PROCEDURE — 1126F AMNT PAIN NOTED NONE PRSNT: CPT | Performed by: INTERNAL MEDICINE

## 2025-08-15 PROCEDURE — 3074F SYST BP LT 130 MM HG: CPT | Performed by: INTERNAL MEDICINE

## 2025-08-15 PROCEDURE — 1090F PRES/ABSN URINE INCON ASSESS: CPT | Performed by: INTERNAL MEDICINE

## 2025-08-15 PROCEDURE — G8399 PT W/DXA RESULTS DOCUMENT: HCPCS | Performed by: INTERNAL MEDICINE

## 2025-08-15 PROCEDURE — 99214 OFFICE O/P EST MOD 30 MIN: CPT | Performed by: INTERNAL MEDICINE

## 2025-08-15 PROCEDURE — 4004F PT TOBACCO SCREEN RCVD TLK: CPT | Performed by: INTERNAL MEDICINE

## 2025-08-15 PROCEDURE — 1160F RVW MEDS BY RX/DR IN RCRD: CPT | Performed by: INTERNAL MEDICINE

## 2025-08-15 PROCEDURE — G8419 CALC BMI OUT NRM PARAM NOF/U: HCPCS | Performed by: INTERNAL MEDICINE

## 2025-08-15 ASSESSMENT — ENCOUNTER SYMPTOMS
SORE THROAT: 0
NAUSEA: 0
SHORTNESS OF BREATH: 0
ABDOMINAL DISTENTION: 0
CHEST TIGHTNESS: 0
ABDOMINAL PAIN: 0
COUGH: 0
VOMITING: 0

## 2025-08-15 ASSESSMENT — PATIENT HEALTH QUESTIONNAIRE - PHQ9
SUM OF ALL RESPONSES TO PHQ QUESTIONS 1-9: 0
2. FEELING DOWN, DEPRESSED OR HOPELESS: NOT AT ALL
SUM OF ALL RESPONSES TO PHQ QUESTIONS 1-9: 0
1. LITTLE INTEREST OR PLEASURE IN DOING THINGS: NOT AT ALL